# Patient Record
Sex: MALE | Race: WHITE | Employment: OTHER | URBAN - NONMETROPOLITAN AREA
[De-identification: names, ages, dates, MRNs, and addresses within clinical notes are randomized per-mention and may not be internally consistent; named-entity substitution may affect disease eponyms.]

---

## 2019-06-12 ENCOUNTER — HOSPITAL ENCOUNTER (EMERGENCY)
Age: 59
Discharge: HOME | End: 2019-06-12
Payer: MEDICAID

## 2019-06-12 VITALS
OXYGEN SATURATION: 97 % | HEART RATE: 74 BPM | TEMPERATURE: 97.5 F | DIASTOLIC BLOOD PRESSURE: 72 MMHG | SYSTOLIC BLOOD PRESSURE: 141 MMHG | RESPIRATION RATE: 20 BRPM

## 2019-06-12 DIAGNOSIS — J01.90 ACUTE RHINOSINUSITIS: ICD-10-CM

## 2019-06-12 DIAGNOSIS — J20.9 ACUTE BRONCHITIS, UNSPECIFIED ORGANISM: Primary | ICD-10-CM

## 2019-06-12 PROCEDURE — 99212 OFFICE O/P EST SF 10 MIN: CPT

## 2019-06-12 PROCEDURE — 99203 OFFICE O/P NEW LOW 30 MIN: CPT | Performed by: NURSE PRACTITIONER

## 2019-06-12 RX ORDER — FLUTICASONE PROPIONATE 50 MCG
2 SPRAY, SUSPENSION (ML) NASAL DAILY
Qty: 1 BOTTLE | Refills: 0 | Status: SHIPPED | OUTPATIENT
Start: 2019-06-12 | End: 2021-10-25

## 2019-06-12 RX ORDER — DEXTROMETHORPHAN POLISTIREX 30 MG/5ML
60 SUSPENSION ORAL 2 TIMES DAILY PRN
Qty: 148 ML | Refills: 0 | Status: SHIPPED | OUTPATIENT
Start: 2019-06-12 | End: 2021-10-25

## 2019-06-12 RX ORDER — AMOXICILLIN AND CLAVULANATE POTASSIUM 875; 125 MG/1; MG/1
1 TABLET, FILM COATED ORAL 2 TIMES DAILY
Qty: 14 TABLET | Refills: 0 | Status: SHIPPED | OUTPATIENT
Start: 2019-06-12 | End: 2019-06-19

## 2019-06-12 ASSESSMENT — PAIN DESCRIPTION - FREQUENCY: FREQUENCY: CONTINUOUS

## 2019-06-12 ASSESSMENT — PAIN DESCRIPTION - DESCRIPTORS: DESCRIPTORS: PRESSURE;OTHER (COMMENT)

## 2019-06-12 ASSESSMENT — PAIN SCALES - GENERAL: PAINLEVEL_OUTOF10: 4

## 2019-06-12 ASSESSMENT — PAIN DESCRIPTION - LOCATION: LOCATION: HEAD;CHEST

## 2019-06-12 ASSESSMENT — PAIN DESCRIPTION - PAIN TYPE: TYPE: ACUTE PAIN

## 2019-06-12 NOTE — ED TRIAGE NOTES
Patient walked to room 1 for nasal congestion, ear fullness and a dry cough onset a week and a half ago. No other needs.

## 2019-06-13 ASSESSMENT — ENCOUNTER SYMPTOMS
NAUSEA: 0
ABDOMINAL PAIN: 0
VOMITING: 0
SORE THROAT: 0
COUGH: 1
SHORTNESS OF BREATH: 0

## 2019-06-13 NOTE — ED PROVIDER NOTES
[facility]   [facility]      [facility]       Chief Complaint   Patient presents with    Nasal Congestion     onset a week and a half ago    Cough     dry cough        Nurses Notes reviewed and I agree except as noted in the HPI. HISTORY OF PRESENT ILLNESS   Bro Coelho is a 63 y.o. male who presents: The history is provided by the patient. Cough   Cough characteristics:  Non-productive and dry  Severity:  Mild  Onset quality:  Sudden  Duration: 1.5 weeks ago. Timing:  Constant  Progression:  Unchanged  Chronicity:  New  Smoker: no    Relieved by:  None tried  Worsened by:  Nothing  Ineffective treatments:  None tried  Associated symptoms: sinus congestion    Associated symptoms: no chest pain, no chills, no diaphoresis, no ear fullness, no ear pain, no fever, no headaches, no myalgias, no rash, no rhinorrhea, no shortness of breath, no sore throat and no wheezing    Risk factors: no recent infection and no recent travel        REVIEW OF SYSTEMS     Review of Systems   Constitutional: Negative for activity change, appetite change, chills, diaphoresis, fatigue and fever. HENT: Positive for congestion. Negative for ear discharge, ear pain, postnasal drip, rhinorrhea, sinus pressure, sneezing, sore throat, trouble swallowing and voice change. Eyes: Negative for visual disturbance. Respiratory: Positive for cough. Negative for chest tightness, shortness of breath, wheezing and stridor. Cardiovascular: Negative for chest pain. Gastrointestinal: Negative for abdominal pain, nausea and vomiting. Genitourinary: Negative for dysuria. Musculoskeletal: Negative for arthralgias and myalgias. Skin: Negative for rash. Neurological: Negative for dizziness, numbness and headaches. Hematological: Negative for adenopathy. Psychiatric/Behavioral: The patient is not nervous/anxious.         PAST MEDICAL HISTORY         Diagnosis Date    Cellulitis     Depressed had in high school    Diabetes mellitus ([facility] [address]     Hyperlipidemia     Hypertension        SURGICAL HISTORY     Patient  has a past surgical history that includes Foot surgery and Vein Surgery (Left, 2014). CURRENT MEDICATIONS       Discharge Medication List as of 6/12/2019  1:27 PM      CONTINUE these medications which have NOT CHANGED    Details   metFORMIN (GLUCOPHAGE) 500 MG tablet Take 500 mg by mouth 2 times daily (with meals)Historical Med      lisinopril (ZESTRIL) 20 MG tablet Take 20 mg by mouth daily. PARoxetine (PAXIL) 20 MG tablet Take 20 mg by mouth every morning. amLODIPine (NORVASC) 10 MG tablet Take 1 tablet by mouth daily. , Disp-30 tablet, R-3      aspirin 325 MG EC tablet Take 325 mg by mouth daily. Ascorbic Acid (VITAMIN C) 500 MG tablet Take 500 mg by mouth daily. glimepiride (AMARYL) 4 MG tablet Take 4 mg by mouth every morning (before breakfast). ALLERGIES     Patient is has No Known Allergies. FAMILY HISTORY     Patient's family history includes Arthritis in his brother, father, and mother; Cancer in his maternal grandfather and maternal grandmother; Diabetes in his mother; Early Death in his father; Heart Disease in his father; High Blood Pressure in his brother, father, and mother; High Cholesterol in his father; Stroke in his father; Vision Loss in his mother. SOCIAL HISTORY     Patient  reports that he has never smoked. He has never used smokeless tobacco. He reports that he drinks alcohol. He reports that he does not use drugs. PHYSICAL EXAM     ED TRIAGE VITALS  BP: (!) 141/72, Temp: 97.5 °F (36.4 °C), Pulse: 74, Resp: 20, SpO2: 97 %  Physical Exam   Constitutional: He is oriented to person, place, and time. He appears well-developed and well-nourished. Non-toxic appearance. No distress. HENT:   Head: Normocephalic and atraumatic. Head is without right periorbital erythema and without left periorbital erythema.    Right Ear: Hearing, tympanic membrane, external ear and ear canal normal.   Left Ear: Hearing, tympanic membrane, external ear and ear canal normal.   Nose: Mucosal edema (edematous, erythematous bilateral turbinates) present. No rhinorrhea or sinus tenderness. Mouth/Throat: Uvula is midline, oropharynx is clear and moist and mucous membranes are normal. Tonsils are 2+ on the right. Tonsils are 2+ on the left. No tonsillar exudate. Eyes: Right conjunctiva is not injected. Left conjunctiva is not injected. Pupils are equal.   Neck: Normal range of motion. Cardiovascular: Normal rate, regular rhythm and normal heart sounds. No murmur heard. Pulmonary/Chest: Effort normal and breath sounds normal. No respiratory distress. Musculoskeletal:        Right knee: He exhibits normal range of motion. Left knee: He exhibits normal range of motion. Lymphadenopathy:        Head (right side): No submental, no submandibular, no tonsillar, no preauricular and no posterior auricular adenopathy present. Head (left side): No submental, no submandibular, no tonsillar, no preauricular and no posterior auricular adenopathy present. He has no cervical adenopathy. Neurological: He is alert and oriented to person, place, and time. No sensory deficit. Skin: Skin is warm, dry and intact. No rash noted. He is not diaphoretic. No cyanosis. No pallor. No obvious signs of infection or trauma. Psychiatric: He has a normal mood and affect. His behavior is normal.   Nursing note and vitals reviewed. DIAGNOSTIC RESULTS   Labs:No results found for this visit on 06/12/19. IMAGING:    URGENT CARE COURSE:     Vitals:    06/12/19 1258   BP: (!) 141/72   Pulse: 74   Resp: 20   Temp: 97.5 °F (36.4 °C)   TempSrc: Temporal   SpO2: 97%       Medications - No data to display  PROCEDURES:  None  FINAL IMPRESSION       1. Acute bronchitis, unspecified organism    2.  Acute rhinosinusitis        DISPOSITION/PLAN   DISPOSITION Decision To Discharge 2019 01:25:28 PM  Afebrile  No acute distress  Start on   Orders Placed This Encounter   Medications    fluticasone (FLONASE) 50 MCG/ACT nasal spray     Si sprays by Nasal route daily Apply daily to each nare     Dispense:  1 Bottle     Refill:  0    amoxicillin-clavulanate (AUGMENTIN) 875-125 MG per tablet     Sig: Take 1 tablet by mouth 2 times daily for 7 days     Dispense:  14 tablet     Refill:  0    dextromethorphan (DELSYM) 30 MG/5ML extended release liquid     Sig: Take 10 mLs by mouth 2 times daily as needed for Cough     Dispense:  148 mL     Refill:  0   Plenty of fluids orally. Salt water gargles as needed for sore throat. Cool mist humidifier at bedside for congestion. Saline rinses as needed for nasal congestion. May take Tylenol  And or Ibuprofen for fever or body aches as directed. May take OTC antihistamines/ decongestant as needed. Follow up with family doctor. Pt to go to ER if symptoms worsen, new symptoms develop, high fever >102, vomiting, breathing difficulty, lethargy. PATIENT REFERRED TO:  MAGGIE Ulloa - CNP  [address] [place] [address] [address]  [phone]    On 2019  If symptoms worsen    Patient instructed to follow up with PCP. If symptoms worsen, become severe or new symptoms develop patient instructedto go to the emergency room immediately.     DISCHARGE MEDICATIONS:  Discharge Medication List as of 2019  1:27 PM      START taking these medications    Details   fluticasone (FLONASE) 50 MCG/ACT nasal spray 2 sprays by Nasal route daily Apply daily to each nare, Disp-1 Bottle, R-0Normal      amoxicillin-clavulanate (AUGMENTIN) 875-125 MG per tablet Take 1 tablet by mouth 2 times daily for 7 days, Disp-14 tablet, R-0Normal      dextromethorphan (DELSYM) 30 MG/5ML extended release liquid Take 10 mLs by mouth 2 times daily as needed for Cough, Disp-148 mL, R-0Normal           Discharge Medication List as of 2019  1:27 PM Patient giveneducational materials - see patient instructions. Discussed use, benefit, and side effects of prescribed medications. All patient questions answered. Pt voiced understanding. Reviewed health maintenance. Patient agreedwith treatment plan. Follow up as directed.      MAGGIE Chambers CNP, APRN - CNP  06/17/19 0334 neuropathic pain at bilateral lower extremities to thighs

## 2019-06-17 ASSESSMENT — ENCOUNTER SYMPTOMS
RHINORRHEA: 0
WHEEZING: 0
SINUS PRESSURE: 0
CHEST TIGHTNESS: 0
TROUBLE SWALLOWING: 0
VOICE CHANGE: 0
STRIDOR: 0
SINUS CONGESTION: 1

## 2020-02-12 ENCOUNTER — APPOINTMENT (OUTPATIENT)
Dept: GENERAL RADIOLOGY | Age: 60
End: 2020-02-12
Payer: MEDICAID

## 2020-02-12 ENCOUNTER — APPOINTMENT (OUTPATIENT)
Dept: CT IMAGING | Age: 60
End: 2020-02-12
Payer: MEDICAID

## 2020-02-12 ENCOUNTER — HOSPITAL ENCOUNTER (EMERGENCY)
Age: 60
Discharge: HOME | End: 2020-02-12
Payer: MEDICAID

## 2020-02-12 VITALS
BODY MASS INDEX: 40.74 KG/M2 | HEIGHT: 71 IN | RESPIRATION RATE: 20 BRPM | DIASTOLIC BLOOD PRESSURE: 78 MMHG | WEIGHT: 291 LBS | TEMPERATURE: 98 F | SYSTOLIC BLOOD PRESSURE: 128 MMHG | HEART RATE: 99 BPM | OXYGEN SATURATION: 96 %

## 2020-02-12 PROCEDURE — 99284 EMERGENCY DEPT VISIT MOD MDM: CPT

## 2020-02-12 PROCEDURE — 73700 CT LOWER EXTREMITY W/O DYE: CPT

## 2020-02-12 PROCEDURE — 73564 X-RAY EXAM KNEE 4 OR MORE: CPT

## 2020-02-12 PROCEDURE — 6370000000 HC RX 637 (ALT 250 FOR IP): Performed by: PHYSICIAN ASSISTANT

## 2020-02-12 PROCEDURE — 2709999900 HC NON-CHARGEABLE SUPPLY

## 2020-02-12 RX ORDER — HYDROCODONE BITARTRATE AND ACETAMINOPHEN 5; 325 MG/1; MG/1
1 TABLET ORAL ONCE
Status: COMPLETED | OUTPATIENT
Start: 2020-02-12 | End: 2020-02-12

## 2020-02-12 RX ORDER — HYDROCODONE BITARTRATE AND ACETAMINOPHEN 5; 325 MG/1; MG/1
1 TABLET ORAL EVERY 6 HOURS PRN
Qty: 10 TABLET | Refills: 0 | Status: SHIPPED | OUTPATIENT
Start: 2020-02-12 | End: 2020-02-15

## 2020-02-12 RX ADMIN — HYDROCODONE BITARTRATE AND ACETAMINOPHEN 1 TABLET: 5; 325 TABLET ORAL at 19:31

## 2020-02-12 ASSESSMENT — ENCOUNTER SYMPTOMS
SHORTNESS OF BREATH: 0
COUGH: 0
DIARRHEA: 0
ABDOMINAL PAIN: 0
VOMITING: 0
BACK PAIN: 0

## 2020-02-12 ASSESSMENT — PAIN SCALES - GENERAL
PAINLEVEL_OUTOF10: 6

## 2020-02-12 ASSESSMENT — PAIN DESCRIPTION - ORIENTATION: ORIENTATION: LEFT

## 2020-02-12 ASSESSMENT — PAIN DESCRIPTION - PAIN TYPE: TYPE: ACUTE PAIN

## 2020-02-12 ASSESSMENT — PAIN DESCRIPTION - LOCATION: LOCATION: KNEE

## 2020-02-12 NOTE — LETTER
[facility] Emergency Department   [facility], [address]          PROOF OF PRESENCE      To Whom It May Concern:    Yaa Poe was present in the Emergency Department at [facility] Emergency Department on 2/12/20. He may return to work on 2/14/20 if cleared by orthopedics. He is not to put weight on the left leg until cleared by orthopedic provider.                                  Sincerely,        Haider Duff PA-C

## 2020-02-13 NOTE — ED NOTES
Pt resting in bed with eyes closed and respirations regular. Pt VS reassessed. Pt states pain is still the same at a 6 out of 10. Updated pt on POC. Pt verbalized understanding.  Will continue to monitor      Gloria Pardo RN  02/12/20 2028

## 2020-02-13 NOTE — ED NOTES
Assumed care at this time. Bedside report received from Jm [place]. Pt alert and c/o left knee and leg pain only. Pt respirations regular.  Will continue to monitor      Holley Delgadillo RN  02/12/20 191
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
## 2020-04-17 ENCOUNTER — HOSPITAL ENCOUNTER (EMERGENCY)
Age: 60
Discharge: HOME | End: 2020-04-17
Payer: MEDICAID

## 2020-04-17 VITALS
HEIGHT: 70 IN | RESPIRATION RATE: 17 BRPM | OXYGEN SATURATION: 95 % | WEIGHT: 291 LBS | HEART RATE: 102 BPM | BODY MASS INDEX: 41.66 KG/M2 | TEMPERATURE: 98.1 F | DIASTOLIC BLOOD PRESSURE: 88 MMHG | SYSTOLIC BLOOD PRESSURE: 142 MMHG

## 2020-04-17 PROCEDURE — 99282 EMERGENCY DEPT VISIT SF MDM: CPT

## 2020-04-17 ASSESSMENT — ENCOUNTER SYMPTOMS
ABDOMINAL PAIN: 0
SHORTNESS OF BREATH: 0
RHINORRHEA: 0
DIARRHEA: 0
SORE THROAT: 0
COUGH: 0
BACK PAIN: 0
EYE REDNESS: 0
ABDOMINAL DISTENTION: 0
EYE DISCHARGE: 0
VOMITING: 0
NAUSEA: 0
CHEST TIGHTNESS: 0

## 2020-04-17 NOTE — ED PROVIDER NOTES
Pressure in his brother, father, and mother; High Cholesterol in his father; Stroke in his father; Vision Loss in his mother. SOCIAL HISTORY       Social History     Socioeconomic History    Marital status: Single     Spouse name: Not on file    Number of children: Not on file    Years of education: Not on file    Highest education level: Not on file   Occupational History    Not on file   Social Needs    Financial resource strain: Not on file    Food insecurity     Worry: Not on file     Inability: Not on file    Transportation needs     Medical: Not on file     Non-medical: Not on file   Tobacco Use    Smoking status: Never Smoker    Smokeless tobacco: Never Used   Substance and Sexual Activity    Alcohol use: Yes     Comment: social     Drug use: No    Sexual activity: Not on file   Lifestyle    Physical activity     Days per week: Not on file     Minutes per session: Not on file    Stress: Not on file   Relationships    Social connections     Talks on phone: Not on file     Gets together: Not on file     Attends Latter day service: Not on file     Active member of club or organization: Not on file     Attends meetings of clubs or organizations: Not on file     Relationship status: Not on file    Intimate partner violence     Fear of current or ex partner: Not on file     Emotionally abused: Not on file     Physically abused: Not on file     Forced sexual activity: Not on file   Other Topics Concern    Not on file   Social History Narrative    Not on file       PHYSICAL EXAM     INITIAL VITALS:  height is 5' 10\" (1.778 m) and weight is 291 lb (132 kg). His oral temperature is 98.1 °F (36.7 °C). His blood pressure is 142/88 (abnormal) and his pulse is 102. His respiration is 17 and oxygen saturation is 95%. Physical Exam  Vitals signs and nursing note reviewed. Constitutional:       Appearance: Normal appearance. He is well-developed. HENT:      Head: Normocephalic.       Right Ear: External ear normal.      Left Ear: External ear normal.      Nose: Nose normal.      Mouth/Throat:      Pharynx: Uvula midline. Eyes:      Conjunctiva/sclera: Conjunctivae normal.   Neck:      Musculoskeletal: Normal range of motion and neck supple. Cardiovascular:      Rate and Rhythm: Normal rate and regular rhythm. Heart sounds: Normal heart sounds, S1 normal and S2 normal.   Pulmonary:      Effort: Pulmonary effort is normal. No respiratory distress. Breath sounds: Normal breath sounds. Comments: Normal lung sounds  Chest:      Chest wall: No tenderness. Abdominal:      General: Bowel sounds are normal. There is no distension. Palpations: Abdomen is soft. Tenderness: There is no abdominal tenderness. Comments: No abdominal tenderness   Musculoskeletal: Normal range of motion. Comments: No tenderness to the lumbar region. No signs of leg swelling. Warm feet. Good pulses. Skin:     General: Skin is warm and dry. Coloration: Skin is not pale. Findings: No erythema or rash. Neurological:      Mental Status: He is alert and oriented to person, place, and time. Psychiatric:         Behavior: Behavior normal.         Thought Content: Thought content normal.         Judgment: Judgment normal.         DIFFERENTIAL DIAGNOSIS:   Including but not limited to: diabetic neuropathy, electrolyte imbalance    DIAGNOSTIC RESULTS     EKG: All EKG's are interpreted by the Emergency Department Physician who eithersigns or Co-signs this chart in the absence of a cardiologist.    None    RADIOLOGY: non-plainfilm images(s) such as CT, Ultrasound and MRI are read by the radiologist.  Plain radiographic images are visualized and preliminarily interpreted by the emergency physician unless otherwise stated below.   No orders to display         LABS:   Labs Reviewed - No data to display    EMERGENCY DEPARTMENT COURSE:   Vitals:    Vitals:    04/17/20 1451   BP: (!) 142/88   Pulse: 102 Resp: 17   Temp: 98.1 °F (36.7 °C)   TempSrc: Oral   SpO2: 95%   Weight: 291 lb (132 kg)   Height: 5' 10\" (1.778 m)     MDM    Patient seen and evaluated in the emergency department, patient appeared to be no acute distress, vital signs were reviewed, slight tachycardia noted although not noted on exam.  Physical exam was completed, he had some slight lower extremity paresthesias, appeared to be consistent with sciatic neuropathy, he has microangiographic changes of the feet but he has good pulses, he has no wounds or signs of erythema or cellulitis to the lower extremities, there is no edema to the lower extremities. He was able to ambulate with little difficulty, he is able to stand on his toes with little difficulty. Symptoms do not appear to be consistent with a spinal cord injury, he has had no recent acute trauma. I discussed my findings my plan of care with the patient he needs to follow-up with his primary care provider, possibly have some vitamin and mineral levels drawn, but at this time not required in the emergency department. He verbalized understanding of plan of care. While here in the emergency department maintained stable course appropriate for discharge. Medications - No data to display      Patient was seenindependently by myself. The patient's final impression and disposition and plan was determined by myself. CRITICAL CARE:   None    CONSULTS:  None    PROCEDURES:  None    FINAL IMPRESSION     1. Peripheral polyneuropathy    2.  Paresthesias          DISPOSITION/PLAN   Patient was discharged in stable condition  PATIENT REFERREDTO:  MAGGIE [place] - LYN  [facility]  [phone]    Call   For follow up and evaluation      DISCHARGE MEDICATIONS:  Discharge Medication List as of 4/17/2020  3:07 PM          (Please note that portions of this note were completed with a voice recognition program.  Efforts were made to edit the dictations but occasionally

## 2020-04-17 NOTE — ED TRIAGE NOTES
Presents to ER for complaints of bilateral lower leg numbness that he noticed this morning. Pt states he has a history of diabetes and occasionally his feet turn numb, but he has not had anything like this before. Pt denies any pain at this time. Respirations unlabored.

## 2021-10-25 ENCOUNTER — HOSPITAL ENCOUNTER (EMERGENCY)
Age: 61
Discharge: HOME | End: 2021-10-25
Payer: MEDICAID

## 2021-10-25 VITALS
DIASTOLIC BLOOD PRESSURE: 87 MMHG | HEART RATE: 78 BPM | OXYGEN SATURATION: 98 % | RESPIRATION RATE: 16 BRPM | SYSTOLIC BLOOD PRESSURE: 133 MMHG | TEMPERATURE: 98.3 F

## 2021-10-25 DIAGNOSIS — J06.9 VIRAL URI WITH COUGH: Primary | ICD-10-CM

## 2021-10-25 DIAGNOSIS — Z20.822 COVID-19 RULED OUT BY LABORATORY TESTING: ICD-10-CM

## 2021-10-25 LAB — SARS-COV-2, NAA: NOT  DETECTED

## 2021-10-25 PROCEDURE — 99213 OFFICE O/P EST LOW 20 MIN: CPT | Performed by: NURSE PRACTITIONER

## 2021-10-25 PROCEDURE — 99213 OFFICE O/P EST LOW 20 MIN: CPT

## 2021-10-25 PROCEDURE — 87635 SARS-COV-2 COVID-19 AMP PRB: CPT

## 2021-10-25 RX ORDER — PRASUGREL 10 MG/1
TABLET, FILM COATED ORAL
COMMUNITY
Start: 2021-04-15

## 2021-10-25 RX ORDER — ASPIRIN 81 MG/1
TABLET, DELAYED RELEASE ORAL
COMMUNITY
Start: 2021-10-12

## 2021-10-25 RX ORDER — ATORVASTATIN CALCIUM 10 MG/1
TABLET, FILM COATED ORAL
COMMUNITY
Start: 2021-04-14

## 2021-10-25 RX ORDER — BENZONATATE 200 MG/1
200 CAPSULE ORAL 3 TIMES DAILY PRN
Qty: 21 CAPSULE | Refills: 0 | Status: SHIPPED | OUTPATIENT
Start: 2021-10-25 | End: 2021-11-01

## 2021-10-25 RX ORDER — METOPROLOL SUCCINATE 25 MG/1
TABLET, EXTENDED RELEASE ORAL
COMMUNITY
Start: 2021-08-23

## 2021-10-25 ASSESSMENT — ENCOUNTER SYMPTOMS
COUGH: 1
SINUS PAIN: 0
SHORTNESS OF BREATH: 0
RHINORRHEA: 1
WHEEZING: 0
CHEST TIGHTNESS: 0
NAUSEA: 0
EYE REDNESS: 0
SINUS PRESSURE: 0
TROUBLE SWALLOWING: 0
DIARRHEA: 0
SORE THROAT: 0
VOMITING: 0
EYE DISCHARGE: 0

## 2021-10-25 NOTE — ED PROVIDER NOTES
prasugrel (EFFIENT) 10 MG TABS Prasugrel (Effient) 10 mg Tablet Active 10 MG PO Daily 30 April 15th, 2021 3:38pmHistorical Med      atorvastatin (LIPITOR) 10 MG tablet Atorvastatin (Lipitor) 10 mg Tablet Active 10 MG PO Daily in the evening April 14th, 2021 9:52amHistorical Med      SM ASPIRIN ADULT LOW STRENGTH 81 MG EC tablet take 1 tablet by mouth once daily, DAWHistorical Med      metoprolol succinate (TOPROL XL) 25 MG extended release tablet take 1 tablet by mouth once dailyHistorical Med      metFORMIN (GLUCOPHAGE) 500 MG tablet Take 500 mg by mouth 2 times daily (with meals)Historical Med      lisinopril (ZESTRIL) 20 MG tablet Take 20 mg by mouth daily. amLODIPine (NORVASC) 10 MG tablet Take 1 tablet by mouth daily. , Disp-30 tablet, R-3      glimepiride (AMARYL) 4 MG tablet Take 4 mg by mouth every morning (before breakfast). Ascorbic Acid (VITAMIN C) 500 MG tablet Take 500 mg by mouth daily. ALLERGIES     Patient is has No Known Allergies. FAMILY HISTORY     Patient'sfamily history includes Arthritis in his brother, father, and mother; Cancer in his maternal grandfather and maternal grandmother; Diabetes in his mother; Early Death in his father; Heart Disease in his father; High Blood Pressure in his brother, father, and mother; High Cholesterol in his father; Stroke in his father; Vision Loss in his mother. SOCIAL HISTORY     Patient  reports that he has never smoked. He has never used smokeless tobacco. He reports current alcohol use. He reports that he does not use drugs. PHYSICAL EXAM     ED TRIAGE VITALS  BP: 133/87, Temp: 98.3 °F (36.8 °C), Pulse: 78, Resp: 16, SpO2: 98 %  Physical Exam  Vitals and nursing note reviewed. Constitutional:       General: He is not in acute distress. Appearance: Normal appearance. He is well-developed. He is not ill-appearing, toxic-appearing or diaphoretic. HENT:      Head: Normocephalic and atraumatic. Jaw: No trismus. Right Ear: Hearing, tympanic membrane, ear canal and external ear normal. No mastoid tenderness. No hemotympanum. Tympanic membrane is not perforated, erythematous or bulging. Left Ear: Hearing, tympanic membrane, ear canal and external ear normal. No mastoid tenderness. No hemotympanum. Tympanic membrane is not perforated, erythematous or bulging. Nose: Congestion present. No rhinorrhea. Mouth/Throat:      Mouth: Mucous membranes are moist.      Pharynx: Oropharynx is clear. Uvula midline. Tonsils: No tonsillar abscesses. Eyes:      General: No scleral icterus. Conjunctiva/sclera: Conjunctivae normal.   Neck:      Thyroid: No thyromegaly. Trachea: Trachea normal.   Cardiovascular:      Rate and Rhythm: Normal rate and regular rhythm. No extrasystoles are present. Chest Wall: PMI is not displaced. Heart sounds: Normal heart sounds. No murmur heard. No friction rub. No gallop. Pulmonary:      Effort: Pulmonary effort is normal. No accessory muscle usage or respiratory distress. Breath sounds: Normal breath sounds. Musculoskeletal:      Cervical back: Normal range of motion and neck supple. Lymphadenopathy:      Head:      Right side of head: No submental, submandibular, tonsillar, preauricular, posterior auricular or occipital adenopathy. Left side of head: No submental, submandibular, tonsillar, preauricular, posterior auricular or occipital adenopathy. Cervical: No cervical adenopathy. Upper Body:      Right upper body: No supraclavicular adenopathy. Left upper body: No supraclavicular adenopathy. Skin:     General: Skin is warm and dry. Coloration: Skin is not pale. Findings: No rash. Comments: Skin intact, warm and dry to touch, no rashes noted on exposed surfaces. Neurological:      Mental Status: He is alert and oriented to person, place, and time. He is not disoriented.    Psychiatric:         Mood and Affect: Mood normal.         Behavior: Behavior is cooperative. DIAGNOSTIC RESULTS   Labs:   Results for orders placed or performed during the hospital encounter of 10/25/21   COVID-19, Rapid   Result Value Ref Range    SARS-CoV-2, [place] NOT  DETECTED NOT DETECTED       IMAGING:  No orders to display     URGENT CARE COURSE:     Vitals:    10/25/21 1812   BP: 133/87   Pulse: 78   Resp: 16   Temp: 98.3 °F (36.8 °C)   TempSrc: Temporal   SpO2: 98%       Medications - No data to display  PROCEDURES:  None  FINALIMPRESSION      1. Viral URI with cough    2. COVID-19 ruled out by laboratory testing        DISPOSITION/PLAN   DISPOSITION Decision To Discharge 10/25/2021 06:47:54 PM  Nontoxic, no distress. COVID-19 negative. Exam consistent with a viral URI with cough. Medication as prescribed. Mucinex over-the-counter. Increase fluids. If symptoms worsen go to ER. PATIENT REFERRED TO:  Bennie Davis MD  [address]  [phone]      Follow-up with PCP as needed. Medication as prescribed. Mucinex over-the-counter. If symptoms worsen return or go to ER.     DISCHARGE MEDICATIONS:  Discharge Medication List as of 10/25/2021  6:48 PM      START taking these medications    Details   benzonatate (TESSALON) 200 MG capsule Take 1 capsule by mouth 3 times daily as needed for Cough, Disp-21 capsule, R-0Normal           Discharge Medication List as of 10/25/2021  6:48 PM          MAGGIE Morales CNP, APRN - CNP  10/25/21 7575

## 2022-04-16 ENCOUNTER — HOSPITAL ENCOUNTER (EMERGENCY)
Age: 62
Discharge: HOME | End: 2022-04-16
Payer: MEDICAID

## 2022-04-16 VITALS
BODY MASS INDEX: 43.05 KG/M2 | HEART RATE: 88 BPM | SYSTOLIC BLOOD PRESSURE: 145 MMHG | WEIGHT: 300 LBS | RESPIRATION RATE: 16 BRPM | TEMPERATURE: 96.8 F | DIASTOLIC BLOOD PRESSURE: 69 MMHG | OXYGEN SATURATION: 96 %

## 2022-04-16 DIAGNOSIS — L03.116 CELLULITIS OF LEFT LOWER LEG: Primary | ICD-10-CM

## 2022-04-16 PROCEDURE — 99213 OFFICE O/P EST LOW 20 MIN: CPT

## 2022-04-16 PROCEDURE — 99214 OFFICE O/P EST MOD 30 MIN: CPT | Performed by: NURSE PRACTITIONER

## 2022-04-16 RX ORDER — SULFAMETHOXAZOLE AND TRIMETHOPRIM 800; 160 MG/1; MG/1
1 TABLET ORAL 2 TIMES DAILY
Qty: 20 TABLET | Refills: 0 | Status: SHIPPED | OUTPATIENT
Start: 2022-04-16 | End: 2022-04-26 | Stop reason: ALTCHOICE

## 2022-04-16 RX ORDER — CHLORHEXIDINE GLUCONATE 213 G/1000ML
SOLUTION TOPICAL
Qty: 236 ML | Refills: 0 | Status: SHIPPED | OUTPATIENT
Start: 2022-04-16 | End: 2022-04-30

## 2022-04-16 ASSESSMENT — PAIN DESCRIPTION - DESCRIPTORS: DESCRIPTORS: ACHING

## 2022-04-16 ASSESSMENT — PAIN DESCRIPTION - ONSET: ONSET: ON-GOING

## 2022-04-16 ASSESSMENT — ENCOUNTER SYMPTOMS
COUGH: 0
EYES NEGATIVE: 1
CHEST TIGHTNESS: 0
SHORTNESS OF BREATH: 0
ALLERGIC/IMMUNOLOGIC NEGATIVE: 1
GASTROINTESTINAL NEGATIVE: 1

## 2022-04-16 ASSESSMENT — PAIN SCALES - GENERAL: PAINLEVEL_OUTOF10: 7

## 2022-04-16 ASSESSMENT — PAIN - FUNCTIONAL ASSESSMENT
PAIN_FUNCTIONAL_ASSESSMENT: 0-10
PAIN_FUNCTIONAL_ASSESSMENT: PREVENTS OR INTERFERES SOME ACTIVE ACTIVITIES AND ADLS

## 2022-04-16 ASSESSMENT — PAIN DESCRIPTION - FREQUENCY: FREQUENCY: CONTINUOUS

## 2022-04-16 ASSESSMENT — PAIN DESCRIPTION - LOCATION: LOCATION: FOOT

## 2022-04-16 ASSESSMENT — PAIN DESCRIPTION - PAIN TYPE: TYPE: ACUTE PAIN

## 2022-04-16 ASSESSMENT — PAIN DESCRIPTION - ORIENTATION: ORIENTATION: RIGHT

## 2022-04-16 ASSESSMENT — PAIN DESCRIPTION - PROGRESSION: CLINICAL_PROGRESSION: GRADUALLY WORSENING

## 2022-04-16 NOTE — ED PROVIDER NOTES
[address]       Chief Complaint   Patient presents with    Wound Check     right foot , inside of ankle       Nurses Notes reviewed and I agree except as noted in the HPI. HISTORY OF PRESENT ILLNESS   Corey Bella is a 65 y.o. male who presents with left foot wound and cellulitis of the left lower leg. Has chronic venous disease and ulcerations. Sees lymphedema/wound clinic of [facility] in the past. This area has been open x 1 week. He has been keeping clean as possible. Blood sugars have been in the lower 100s per pt. REVIEW OF SYSTEMS     Review of Systems   Constitutional: Positive for activity change. HENT: Negative. Eyes: Negative. Respiratory: Negative for cough, chest tightness and shortness of breath. Cardiovascular: Positive for leg swelling. Negative for palpitations. Gastrointestinal: Negative. Endocrine: Negative. Genitourinary: Negative. Musculoskeletal: Positive for joint swelling and myalgias. Skin: Positive for rash and wound. Allergic/Immunologic: Negative. Neurological: Negative for weakness, light-headedness and numbness. Hematological: Negative for adenopathy. PAST MEDICAL HISTORY         Diagnosis Date    Cellulitis     Depressed     had in high school    Diabetes mellitus ([facility] [address]     Hyperlipidemia     Hypertension        SURGICAL HISTORY     Patient  has a past surgical history that includes Foot surgery; Vein Surgery (Left, 2014); and Eye surgery. CURRENT MEDICATIONS       Previous Medications    AMLODIPINE (NORVASC) 10 MG TABLET    Take 1 tablet by mouth daily. ASCORBIC ACID (VITAMIN C) 500 MG TABLET    Take 500 mg by mouth daily. ATORVASTATIN (LIPITOR) 10 MG TABLET    Atorvastatin (Lipitor) 10 mg Tablet Active 10 MG PO Daily in the evening April 14th, 2021 9:52am    GLIMEPIRIDE (AMARYL) 4 MG TABLET    Take 4 mg by mouth every morning (before breakfast).     LISINOPRIL (ZESTRIL) 20 MG TABLET    Take 20 mg by mouth daily. METFORMIN (GLUCOPHAGE) 500 MG TABLET    Take 500 mg by mouth 2 times daily (with meals)    METOPROLOL SUCCINATE (TOPROL XL) 25 MG EXTENDED RELEASE TABLET    take 1 tablet by mouth once daily    PRASUGREL (EFFIENT) 10 MG TABS    Prasugrel (Effient) 10 mg Tablet Active 10 MG PO Daily 30 April 15th, 2021 3:38pm    SM ASPIRIN ADULT LOW STRENGTH 81 MG EC TABLET    take 1 tablet by mouth once daily       ALLERGIES     Patient is has No Known Allergies. FAMILY HISTORY     Patient'sfamily history includes Arthritis in his brother, father, and mother; Cancer in his maternal grandfather and maternal grandmother; Diabetes in his mother; Early Death in his father; Heart Disease in his father; High Blood Pressure in his brother, father, and mother; High Cholesterol in his father; Stroke in his father; Vision Loss in his mother. SOCIAL HISTORY     Patient  reports that he has never smoked. He has never used smokeless tobacco. He reports current alcohol use. He reports that he does not use drugs. PHYSICAL EXAM     ED TRIAGE VITALS  BP: (!) 145/69, Temp: 96.8 °F (36 °C), Pulse: 88, Resp: 16, SpO2: 96 %  Physical Exam  Vitals and nursing note reviewed. Constitutional:       Appearance: He is obese. He is ill-appearing. HENT:      Head: Normocephalic. Right Ear: Ear canal and external ear normal.      Left Ear: Ear canal and external ear normal.      Nose: Nose normal.      Mouth/Throat:      Mouth: Mucous membranes are moist.   Eyes:      General: No scleral icterus. Conjunctiva/sclera: Conjunctivae normal.   Cardiovascular:      Rate and Rhythm: Normal rate and regular rhythm. Pulses: Normal pulses. Heart sounds: Normal heart sounds. Pulmonary:      Effort: Pulmonary effort is normal.   Abdominal:      General: Abdomen is flat. Musculoskeletal:         General: Swelling and tenderness ( left lower extremity) present.       Cervical back: Normal range of motion and neck supple. No tenderness. Lymphadenopathy:      Cervical: No cervical adenopathy. Skin:     General: Skin is warm and dry. Capillary Refill: Capillary refill takes 2 to 3 seconds. Coloration: Skin is not pale. Findings: Erythema (left lower leg. see photo), lesion and rash present. Neurological:      Mental Status: He is alert. DIAGNOSTIC RESULTS   Labs: No results found for this visit on 04/16/22. IMAGING:  No orders to display     URGENT CARE COURSE:     Vitals:    04/16/22 1744   BP: (!) 145/69   Pulse: 88   Resp: 16   Temp: 96.8 °F (36 °C)   TempSrc: Temporal   SpO2: 96%   Weight: 300 lb (136.1 kg)       Medications - No data to display  PROCEDURES:  None  FINALIMPRESSION    I have reviewed the patient's medical history in detail and updated the computerized patient record. HPI/ROS per the patient and caregiver. Overall non toxic in appearance. Answers questions appropriately. Conditions discussed and addressed this visit include:     Pt with chronic venous ulcerations of the left lower leg. Managed per lymphedema clinic in the past. Will start with antibiotic tx, dry dressing, twice daily cleansing, and pt to follow up with pcp this week for new referral to lymphedema clinic. 1. Cellulitis of left lower leg        DISPOSITION/PLAN   DISPOSITION      PATIENT REFERRED TO:  Isrrael Corley MD  [address] [facility]  [phone]    In 3 days  For wound re-check    DISCHARGE MEDICATIONS:  New Prescriptions    CHLORHEXIDINE (HIBICLENS) 4 % EXTERNAL LIQUID    Apply topically daily as needed.     SULFAMETHOXAZOLE-TRIMETHOPRIM (BACTRIM DS) 800-160 MG PER TABLET    Take 1 tablet by mouth 2 times daily for 10 days     Current Discharge Medication List          MAGGIE Maldonado - MAGGIE James - LYN  04/16/22 7885

## 2022-04-16 NOTE — ED TRIAGE NOTES
Wound cleansed with wound cleanser and dried thoroughly. Gauze pad placed over the wound and secured with pt's sock. Pt tolerated well.

## 2022-04-16 NOTE — ED TRIAGE NOTES
Pt to room 3 with concerns regarding a sore that has been on his right medial ankle for \"quite some time\" but has worsened in the last few days.

## 2022-04-20 ENCOUNTER — TELEPHONE (OUTPATIENT)
Dept: WOUND CARE | Age: 62
End: 2022-04-20

## 2022-04-20 NOTE — TELEPHONE ENCOUNTER
Returned call to patient regarding referral to wound clinic to get patient scheduled. No answer. Left VM.

## 2022-04-20 NOTE — TELEPHONE ENCOUNTER
----- Message from Doron Potter sent at 4/20/2022 10:13 AM EDT -----   [phone] JENNIFER RETURNED MISSED CALL TO GET HIM AN APPT. HE STATES THAT HE NEEDS TO BE SEEN ASAP. HIS LEGS ARE BAD AND GETTING WORSE.

## 2022-04-20 NOTE — TELEPHONE ENCOUNTER
----- Message from Kenna Haq sent at 4/20/2022 10:13 AM EDT -----   [phone] JENNIFER RETURNED MISSED CALL TO GET HIM AN APPT. HE STATES THAT HE NEEDS TO BE SEEN ASAP. HIS LEGS ARE BAD AND GETTING WORSE.

## 2022-04-26 ENCOUNTER — HOSPITAL ENCOUNTER (OUTPATIENT)
Dept: WOUND CARE | Age: 62
Discharge: HOME | End: 2022-04-26
Payer: MEDICAID

## 2022-04-26 VITALS — TEMPERATURE: 97.2 F

## 2022-04-26 DIAGNOSIS — L98.499 TYPE 2 DIABETES MELLITUS WITH OTHER SKIN ULCER, UNSPECIFIED WHETHER LONG TERM INSULIN USE (HCC): ICD-10-CM

## 2022-04-26 DIAGNOSIS — L97.302: ICD-10-CM

## 2022-04-26 DIAGNOSIS — L84 CALLUS OF FOOT: ICD-10-CM

## 2022-04-26 DIAGNOSIS — I87.319 CHRONIC VENOUS HYPERTENSION W ULCERATION (HCC): ICD-10-CM

## 2022-04-26 DIAGNOSIS — E11.43 TYPE 2 DIABETES MELLITUS WITH DIABETIC AUTONOMIC NEUROPATHY, WITH LONG-TERM CURRENT USE OF INSULIN (HCC): ICD-10-CM

## 2022-04-26 DIAGNOSIS — E11.622 TYPE 2 DIABETES MELLITUS WITH OTHER SKIN ULCER, UNSPECIFIED WHETHER LONG TERM INSULIN USE (HCC): ICD-10-CM

## 2022-04-26 DIAGNOSIS — L60.2 ONYCHOGRYPHOSIS: ICD-10-CM

## 2022-04-26 DIAGNOSIS — L97.301: Primary | ICD-10-CM

## 2022-04-26 DIAGNOSIS — S90.229A CONTUSION OF NAILBED OF TOE: ICD-10-CM

## 2022-04-26 DIAGNOSIS — Z79.4 TYPE 2 DIABETES MELLITUS WITH DIABETIC AUTONOMIC NEUROPATHY, WITH LONG-TERM CURRENT USE OF INSULIN (HCC): ICD-10-CM

## 2022-04-26 DIAGNOSIS — L97.909 CHRONIC VENOUS HYPERTENSION W ULCERATION (HCC): ICD-10-CM

## 2022-04-26 DIAGNOSIS — L97.322 SKIN ULCER OF LEFT ANKLE WITH FAT LAYER EXPOSED (HCC): ICD-10-CM

## 2022-04-26 PROCEDURE — 97597 DBRDMT OPN WND 1ST 20 CM/<: CPT

## 2022-04-26 PROCEDURE — 11721 DEBRIDE NAIL 6 OR MORE: CPT

## 2022-04-26 PROCEDURE — 11055 PARING/CUTG B9 HYPRKER LES 1: CPT

## 2022-04-26 PROCEDURE — 99213 OFFICE O/P EST LOW 20 MIN: CPT

## 2022-04-26 PROCEDURE — 29580 STRAPPING UNNA BOOT: CPT

## 2022-04-26 RX ORDER — LIDOCAINE HYDROCHLORIDE 20 MG/ML
JELLY TOPICAL ONCE
Status: CANCELLED | OUTPATIENT
Start: 2022-04-26 | End: 2022-04-26

## 2022-04-26 RX ORDER — BETAMETHASONE DIPROPIONATE 0.05 %
OINTMENT (GRAM) TOPICAL ONCE
Status: CANCELLED | OUTPATIENT
Start: 2022-04-26 | End: 2022-04-26

## 2022-04-26 RX ORDER — LIDOCAINE 40 MG/G
CREAM TOPICAL ONCE
Status: CANCELLED | OUTPATIENT
Start: 2022-04-26 | End: 2022-04-26

## 2022-04-26 RX ORDER — BACITRACIN ZINC AND POLYMYXIN B SULFATE 500; 1000 [USP'U]/G; [USP'U]/G
OINTMENT TOPICAL ONCE
Status: CANCELLED | OUTPATIENT
Start: 2022-04-26 | End: 2022-04-26

## 2022-04-26 RX ORDER — CLOBETASOL PROPIONATE 0.5 MG/G
OINTMENT TOPICAL ONCE
Status: CANCELLED | OUTPATIENT
Start: 2022-04-26 | End: 2022-04-26

## 2022-04-26 RX ORDER — BACITRACIN, NEOMYCIN, POLYMYXIN B 400; 3.5; 5 [USP'U]/G; MG/G; [USP'U]/G
OINTMENT TOPICAL ONCE
Status: CANCELLED | OUTPATIENT
Start: 2022-04-26 | End: 2022-04-26

## 2022-04-26 RX ORDER — LIDOCAINE HYDROCHLORIDE 40 MG/ML
SOLUTION TOPICAL ONCE
Status: CANCELLED | OUTPATIENT
Start: 2022-04-26 | End: 2022-04-26

## 2022-04-26 RX ORDER — GINSENG 100 MG
CAPSULE ORAL ONCE
Status: CANCELLED | OUTPATIENT
Start: 2022-04-26 | End: 2022-04-26

## 2022-04-26 RX ORDER — LIDOCAINE 50 MG/G
OINTMENT TOPICAL ONCE
Status: CANCELLED | OUTPATIENT
Start: 2022-04-26 | End: 2022-04-26

## 2022-04-26 RX ORDER — GENTAMICIN SULFATE 1 MG/G
OINTMENT TOPICAL ONCE
Status: CANCELLED | OUTPATIENT
Start: 2022-04-26 | End: 2022-04-26

## 2022-04-26 NOTE — PROGRESS NOTES
[facility]          Progress Note and Procedure Note      Niall Grewal  MEDICAL RECORD NUMBER:  [id number]  AGE: 65 y.o. GENDER: male  : 1960  EPISODE DATE:  2022    Subjective:     Chief Complaint   Patient presents with    Wound Check         HISTORY of PRESENT ILLNESS HPI     Niall Grewal is a 65 y.o. male New patient referred by self, who presents today for wound/ulcer evaluation. Patient was last treated by Dr. Sheldon Hill in 2016. History of Wound Context: Abrasion noted to left medial ankle. Wound/Ulcer Pain Timing/Severity: intermittent  Quality of pain: dull, aching  Severity:  3 / 10   Modifying Factors: Pain worsens with Palpation  Associated Signs/Symptoms: edema, drainage and numbness    Interval History:   Patient presents today for follow up on wound/ulcer's progression. The patient is currently on antibiotics. Patient states that he was wearing his shoes around [facility] and created an abrasion to the medial aspect of his left ankle. Patient's shoes are very worn down. He presented to the emergency department for wound care treatment. He was given 2 antibiotics and dressings were applied. Patient was encouraged to follow-up in [facility]. Current dressing care includes nonadherent dressing and DSD that was applied while in the ED on 2022. Patient states that he has had Unna boots applied to his BLE with great results. He denies current nausea, vomiting, fever, chills, chest pain or shortness of breath at this time. Patient states while he was putting socks on his right great toenail was torn a few days ago. Patient denies other pedal problems at this time.         PAST MEDICAL HISTORY        Diagnosis Date    Cellulitis     Depressed     had in high school    Diabetes mellitus ([facility] [address]     Hyperlipidemia     Hypertension        PAST SURGICAL HISTORY    Past Surgical History:   Procedure Laterality Date    EYE SURGERY      laser    FOOT SURGERY      VEIN SURGERY Left 2014       FAMILY HISTORY    Family History   Problem Relation Age of Onset    Arthritis Mother     Diabetes Mother     High Blood Pressure Mother     Vision Loss Mother     Arthritis Father     Heart Disease Father     Early Death Father     Stroke Father     High Blood Pressure Father     High Cholesterol Father     Arthritis Brother     High Blood Pressure Brother     Cancer Maternal Grandmother     Cancer Maternal Grandfather        SOCIAL HISTORY    Social History     Tobacco Use    Smoking status: Never Smoker    Smokeless tobacco: Never Used   Vaping Use    Vaping Use: Never used   Substance Use Topics    Alcohol use: Yes     Comment: social     Drug use: No       ALLERGIES    No Known Allergies    MEDICATIONS    Current Outpatient Medications on File Prior to Encounter   Medication Sig Dispense Refill    chlorhexidine (HIBICLENS) 4 % external liquid Apply topically daily as needed. 236 mL 0    SM ASPIRIN ADULT LOW STRENGTH 81 MG EC tablet take 1 tablet by mouth once daily      prasugrel (EFFIENT) 10 MG TABS Prasugrel (Effient) 10 mg Tablet Active 10 MG PO Daily 30 April 15th, 2021 3:38pm      metoprolol succinate (TOPROL XL) 25 MG extended release tablet take 1 tablet by mouth once daily      atorvastatin (LIPITOR) 10 MG tablet Atorvastatin (Lipitor) 10 mg Tablet Active 10 MG PO Daily in the evening April 14th, 2021 9:52am      metFORMIN (GLUCOPHAGE) 500 MG tablet Take 500 mg by mouth 2 times daily (with meals)      lisinopril (ZESTRIL) 20 MG tablet Take 20 mg by mouth daily.  amLODIPine (NORVASC) 10 MG tablet Take 1 tablet by mouth daily. 30 tablet 3    glimepiride (AMARYL) 4 MG tablet Take 4 mg by mouth every morning (before breakfast).  Ascorbic Acid (VITAMIN C) 500 MG tablet Take 500 mg by mouth daily. No current facility-administered medications on file prior to encounter.        REVIEW OF SYSTEMS    Pertinent items are noted in HPI.    Objective:      Temp 97.2 °F (36.2 °C) (Infrared)     Wt Readings from Last 3 Encounters:   04/16/22 300 lb (136.1 kg)   04/17/20 291 lb (132 kg)   02/12/20 291 lb (132 kg)       PHYSICAL EXAM    General Appearance: alert and oriented to person, place and time    Vasc: DP/PT pulses palpable bilaterally. CFT brisk to bilateral digits. Hemosiderin deposits noted to bilateral lower extremity, left greater than right. Lower extremity lymphedema, left greater than right. Diffuse varicosities noted to BLE. Derm: Full-thickness ulcer noted to medial aspect of the left ankle with fibrogranular base. Surrounding tissue edematous. No active drainage, purulence, erythema, malodor, necrosis or PTB noted at this time. Hypergranular tissue noted to dorsal aspect of right great toe nailbed postdebridement of nails. Thickened, elongated, dystrophic and discolored nails with subungual debris x10. Subungual debris of right great toe malodorous. No active drainage, purulence, necrosis or PTB noted at this time. HPK noted to right 1st medial IPJ. Neuro: Light touch sensation diminished to bilateral lower extremity. MSK: No pain or tenderness with palpation of bilateral lower extremity wounds. Bilateral equinus noted.     Medial LLE predebridement      Medial LLE post debridement      Dorsal right great toe postdebridement and after silver nitrate application            Wound 02/19/14 Ankle Left;Proximal #1 left proximal medial ankle (Active)   Dressing Status Intact 10/04/16 1540   Dressing Changed Changed/New 10/04/16 1540   Dressing/Treatment Other (Comment) 10/03/16 1318   Wound Cleansed Rinsed/Irrigated with saline 10/04/16 1540   Dressing Change Due 05/14/14 05/07/14 1100   Wound Length (cm) 0 cm 10/18/16 1559   Wound Width (cm) 0 cm 10/18/16 1559   Wound Depth (cm)  0 10/18/16 1559   Calculated Wound Size (cm^2) (l*w) 0 cm^2 10/18/16 1559   Change in Wound Size % (l*w) 100 10/18/16 1559   Wound Assessment Other (Comment) 05/07/14 1100   Drainage Amount Moderate 10/04/16 1540   Drainage Description Serous 10/04/16 1540   Odor None 10/04/16 1540   Margins Attached edges 10/04/16 1540   Brittni-wound Assessment Edema 04/01/15 1234   [place]%Wound Bed 90 10/04/16 1540   Red%Wound Bed 5 10/03/16 1318   Yellow%Wound Bed 10 10/04/16 1540   Time out Yes 09/13/16 1340   Procedural Pain 0 09/13/16 1340   Post procedural Pain 0 09/13/16 1340   Number of days: 2988       Wound 04/26/22 Ankle Left;Medial #1 (Active)   Wound Image   04/26/22 1433   Wound Etiology Venous 04/26/22 1433   Wound Cleansed Cleansed with saline 04/26/22 1433   Offloading for Diabetic Foot Ulcers Offloading not ordered 04/26/22 1433   Wound Length (cm) 1.9 cm 04/26/22 1433   Wound Width (cm) 1.8 cm 04/26/22 1433   Wound Depth (cm) 0.1 cm 04/26/22 1433   Wound Surface Area (cm^2) 3.42 cm^2 04/26/22 1433   Wound Volume (cm^3) 0.342 cm^3 04/26/22 1433   Wound Assessment Granulation tissue;Slough;Fibrinous 04/26/22 1433   Drainage Amount Moderate 04/26/22 1433   Drainage Description Serosanguinous; Yellow 04/26/22 1433   Odor None 04/26/22 1433   Brittni-wound Assessment Hyperpigmented;Hypopigmented;Blanchable erythema;Edematous; Other (Comment) 04/26/22 1433   Margins Attached edges 04/26/22 1433   Wound Thickness Description not for Pressure Injury Full thickness 04/26/22 1433   Number of days: 0       LABS      CBC:   Lab Results   Component Value Date    WBC 6.5 03/08/2015    HGB 12.0 03/08/2015    HCT 36.0 03/08/2015    MCV 88.7 03/08/2015     03/08/2015     BMP:   Lab Results   Component Value Date     03/08/2015    K 4.3 03/08/2015     03/08/2015    CO2 24 03/08/2015    BUN 10 03/08/2015    CREATININE 0.7 03/08/2015     PT/INR:   Lab Results   Component Value Date    INR 0.99 03/08/2015     Prealbumin: No results found for: PREALBUMIN  Albumin:No results found for: LABALBU  Sed Rate:No results found for: SEDRATE  CRP: No results found for: CRP  Micro: No results found for: BC   Hemoglobin A1C:   Lab Results   Component Value Date    LABA1C 6.5 03/07/2015       Assessment:     Ulcer Identification:  Ulcer Type: traumatic  Contributing Factors: edema, venous stasis, lymphedema, diabetes, chronic pressure, decreased mobility, shear force and obesity    Wound: Abrasion medial left ankle and hypergranular tissue noted to dorsal right great toe wound bed    Depth of Diabetic/Pressure/Non Pressure Ulcers or Wound:  Wound, full thickness    Patient Active Problem List   Diagnosis Code    Chronic venous hypertension w ulceration (HCC) I87.319, L97.909    Ulcer of ankle ([facility] [address] L97.309    Diabetes mellitus, type 2 (HCC) E11.9    Varicosities of leg I83.90    Chronic venous hypertension with complication T46.744    Cellulitis of left lower extremity L03. 80    Benign essential HTN I10       Procedure Note  Indications:  Based on my examination of this patient's wound(s)/ulcer(s) today, debridement is required to promote healing and evaluate the extent healing. Performed by: Sheyla Lugo DPM    Consent obtained: Yes    Time out taken:  Yes    Pain control: Neuropathy      Debridement:Excisional Debridement    Using curette and tissue nippers the wound(s)/ulcer(s) was/were sharply debrided down through and including the removal of epidermis, dermis and subcutaneous tissue. Devitalized Tissue Debrided:  fibrin, biofilm, exudate and hypergranular tissue    Pre Debridement Measurements:  Are located in the Wound/Ulcer Documentation Flow Sheet    Wound/Ulcer #: 2    Post Debridement Measurements:    Wound/Ulcer Descriptions are listed under Physical Exam above.     Wound/Ulcer Descriptions are Pre Debridement except measurements    Percent of Wound/Ulcer Debrided: 100%    Total Surface Area Debrided:  3.5 sq cm     Bleeding:  Minimal    Hemostasis Achieved:  by silver nitrate stick, to dorsal right great toe    Procedural Pain:  0  / 10     Post Procedural Pain:  0 / 10     Response to treatment:  Well tolerated by patient. Plan:     Patient examined and evaluated  HPK noted to right 1st medial IPJ debrided with a 15 blade WOI  Nails debrided x10. Hypergranular tissue noted underneath right great toenail. Hypergranular tissue debrided and silver nitrate applied. Monitor wound bed  Medial LLE wound debrided, procedure note above  Betadine and Mepitel Ag applied to dorsal aspect of right great toe nailbed and dress with DSD. Mepitel Ag and alginate to medial left ankle wound. Apply Unna boots and coban to bilateral lower extremities  Instructed patient to keep dressings clean, dry and intact  HH to change dressings 2x/week. Patient has requested [facility] HH due to previous relationship  Ordered hemoglobin A1c, diabetic shoes and home health to provide dressing changes 2 times per week and OT to lymphedema pump training  Encouraged patient to complete full course of antibiotics prescribed by ED  Follow-up in 3 weeks or sooner prn    Treatment:   Orders Placed This Encounter   Procedures    Hemoglobin A1C     Standing Status:   Future     Standing Expiration Date:   4/26/2023    External Referral To Home Health     Referral Priority:   Routine     Referral Type:   Home Health Care     Referral Reason:   Specialty Services Required     Requested Specialty:   [address]     Number of Visits Requested:   1    Debride wound     Standing Status:   Standing     Number of Occurrences:   1    Debride nail     Standing Status:   Standing     Number of Occurrences:   1    DME Order for (Specify) as OP     - DME device ordered - Diabetic shoes with custom orthotics   - Diagnosis: Diabetic Neuropathy, Diabetic Ulceration left  - Length of Need: 12 Months         Antibiotics: Yes    Follow up: 3 weeks    Please see attached Discharge Instructions    Written patient dismissal instructions given to patient and signed by patient or POA. Discharge Instructions       Visit Discharge/Physician Orders:  - Toenails trimmed today   - callous of right foot paired today   - left ankle wound debrided today  - finish antibiotics as prescribed  - HGB A1C ordered today. - elevate throughout the day to help with swelling.   - use your lymphadema pumps at least 1 hour a day  - script for diabetic shoes and inserts   - referral to [facility]. Home Care:    Wound Location: Left Medial Ankle     Dressing orders:     1) Gather wound care supplies and arrange on clean table. 2) Wash your hands with soap and water or use alcohol based hand  for 20 seconds (sing \"Happy Birthday\" twice). 3) Cleanse wounds with normal saline or wound cleanser and gauze. Pat dry with clean gauze. 4) Left Medial Ankle -  Remove old unna boots to left lower legs. Wash legs with warm soapy water. Pat dry. Cleanse wound with normal saline or wound cleanser and gauze. Pat dry with clean gauze. Apply silver alginate to wound bed. Apply unna boots then ABD then kerlix then coban to left lower legs from base of toes to 1-2 inches below bend of knee. Home health to change 2 times a week. Right leg- Remove old unna boots to right lower legs. Wash legs with warm soapy water. Pat dry. Cleanse wound with normal saline or wound cleanser and gauze. Pat dry with clean gauze. Apply unna boots then coban to right lower legs from base of toes to 1-2 inches below bend of knee. Home health to change 2 times a week. If unna boot becomes too tight- raise/elevate legs above the level of the heart for 15-20 minutes if swelling does not go down then carefully cut off unna boot and call clinic or go to local ER or family physician. If unna boot becomes wet or starts to roll down then carefully remove unna boot and call clinic. Right great toe- Apply Mepitel Ag to wound. Wrap with dry dressing. Change 2 times a week. Keep all dressings clean & dry.     Do not shower, take baths or get wound wet, unless otherwise instructed by your Wound Care doctor. Follow up visit:  3  Weeks May 17th at 2:00pm  Keep next scheduled appointment. Please give 24 hour notice if unable to keep appointment. [phone]    If you experience any of the following, please call the Wound Care Service during business hours: Monday through Friday 8:00 am - 4:30 pm  ([phone]). *Increase in pain   *Temperature over 101   *Increase in drainage from your wound or a foul odor   *Uncontrolled swelling   *Need for compression bandage changes due to slippage, breakthrough drainage    If you need medical attention outside of business hours, please contact your Primary Care Doctor or go to the nearest emergency room.                    Electronically signed by Susie Ontiveros DPM on 4/26/2022 at 4:12 PM

## 2022-04-26 NOTE — PLAN OF CARE
Problem: Skin/Tissue Integrity  Goal: Absence of new skin breakdown  Description: 1. Monitor for areas of redness and/or skin breakdown  2. Assess vascular access sites hourly  3. Every 4-6 hours minimum:  Change oxygen saturation probe site  4. Every 4-6 hours:  If on nasal continuous positive airway pressure, respiratory therapy assess nares and determine need for appliance change or resting period. Outcome: Progressing  Note: New Patient seen today for left ankle wound. Wound debrided today. Toenails trimmed today. Callous to right foot paired today. No s/s of infection noted. Bilateral unna boots applied today. Referral to [facility] health made today. Follow up 4 weeks. See AVS for order changes. Care plan reviewed with patient. Patient verbalize understanding of the plan of care and contribute to goal setting.

## 2022-04-26 NOTE — PROGRESS NOTES
Teaching Note:    I was present with the resident during the visit. I discussed the case with the resident and agree with the findings and the plan as documented in the residents note.     KATE Rodriguez, [address] [place] [address] Surgery       [facility]

## 2022-04-26 NOTE — PROGRESS NOTES
[employer] Application   Below Knee    NAME:  Natty Shabazz  YOB: 1960  MEDICAL RECORD NUMBER:  [id number]  DATE:  4/26/2022    Martha Liu boot: Appied primary and secondary dressing as ordered. Applied Unna roll from toes to knee overlapping each time. Applied ace wrap or coban from toes to below the knee. Secured with tape and/or metal clips covered with tape. Instructed patient/caregiver to keep dressing dry and intact. DO NOT REMOVE DRESSING. Instructed pt/family/caregiver to report excessive draining, loose bandage, wet dressing, severe pain or tingling in toes. Applied [place] dressing below the knee to right lower leg. Applied [place] dressing below the knee to left lower leg. Unna Boot(s) were applied per  Guidelines.      Electronically signed by Shane Woo RN on 4/26/2022 at [id number]

## 2022-04-27 ENCOUNTER — TELEPHONE (OUTPATIENT)
Dept: WOUND CARE | Age: 62
End: 2022-04-27

## 2022-04-27 NOTE — TELEPHONE ENCOUNTER
----- Message from Kenna Haq sent at 4/27/2022 11:07 AM EDT -----  Rodrig[address] [phone] [facility] SAYS THE FACE TO [address] [place] [address] AIDE FOR BILATERAL DRESSING CHANGES, HOWEVER THE AIDE DOES NOT DO DRESSING CHANGES, ONLY PERSONAL CARE, SO NEEDS UPDATED TO SAY SKILLED NURSING.  ALSO NEED HOW AND FREQUENCY OF DRESSING CHANGES

## 2022-05-02 ENCOUNTER — TELEPHONE (OUTPATIENT)
Dept: WOUND CARE | Age: 62
End: 2022-05-02

## 2022-05-02 NOTE — TELEPHONE ENCOUNTER
----- Message from Samuel Reyes sent at 5/2/2022 10:53 AM EDT -----  Zay Chandler [phone] [facility]AL[facility] @ [address] [place] [address], ASKED IF WE CAN FAX TO THEM THE OFFICE NOTES FROM ABBY'S VISIT ON 4/26

## 2022-05-17 ENCOUNTER — HOSPITAL ENCOUNTER (OUTPATIENT)
Dept: WOUND CARE | Age: 62
Discharge: HOME | End: 2022-05-17
Payer: MEDICAID

## 2022-05-17 ENCOUNTER — NURSE ONLY (OUTPATIENT)
Dept: LAB | Age: 62
End: 2022-05-17

## 2022-05-17 VITALS
WEIGHT: 283 LBS | BODY MASS INDEX: 40.52 KG/M2 | HEART RATE: 91 BPM | TEMPERATURE: 97.3 F | HEIGHT: 70 IN | DIASTOLIC BLOOD PRESSURE: 88 MMHG | OXYGEN SATURATION: 94 % | SYSTOLIC BLOOD PRESSURE: 142 MMHG | RESPIRATION RATE: 16 BRPM

## 2022-05-17 DIAGNOSIS — L97.909 CHRONIC VENOUS HYPERTENSION W ULCERATION (HCC): ICD-10-CM

## 2022-05-17 DIAGNOSIS — E11.622 TYPE 2 DIABETES MELLITUS WITH OTHER SKIN ULCER, UNSPECIFIED WHETHER LONG TERM INSULIN USE (HCC): ICD-10-CM

## 2022-05-17 DIAGNOSIS — L98.499 TYPE 2 DIABETES MELLITUS WITH OTHER SKIN ULCER, UNSPECIFIED WHETHER LONG TERM INSULIN USE (HCC): ICD-10-CM

## 2022-05-17 DIAGNOSIS — L03.116 CELLULITIS OF LEFT LOWER EXTREMITY: ICD-10-CM

## 2022-05-17 DIAGNOSIS — I87.319 CHRONIC VENOUS HYPERTENSION W ULCERATION (HCC): ICD-10-CM

## 2022-05-17 DIAGNOSIS — L97.301: ICD-10-CM

## 2022-05-17 DIAGNOSIS — L97.322 SKIN ULCER OF LEFT ANKLE WITH FAT LAYER EXPOSED (HCC): ICD-10-CM

## 2022-05-17 DIAGNOSIS — I87.393 CHRONIC VENOUS HYPERTENSION WITH COMPLICATION INVOLVING BOTH SIDES: Primary | ICD-10-CM

## 2022-05-17 LAB
AVERAGE GLUCOSE: 159 MG/DL (ref 70–126)
HBA1C MFR BLD: 7.3 % (ref 4.4–6.4)

## 2022-05-17 PROCEDURE — 29580 STRAPPING UNNA BOOT: CPT

## 2022-05-17 PROCEDURE — 97597 DBRDMT OPN WND 1ST 20 CM/<: CPT

## 2022-05-17 RX ORDER — LIDOCAINE HYDROCHLORIDE 20 MG/ML
JELLY TOPICAL ONCE
Status: CANCELLED | OUTPATIENT
Start: 2022-05-17 | End: 2022-05-17

## 2022-05-17 RX ORDER — CLOBETASOL PROPIONATE 0.5 MG/G
OINTMENT TOPICAL ONCE
Status: CANCELLED | OUTPATIENT
Start: 2022-05-17 | End: 2022-05-17

## 2022-05-17 RX ORDER — BACITRACIN, NEOMYCIN, POLYMYXIN B 400; 3.5; 5 [USP'U]/G; MG/G; [USP'U]/G
OINTMENT TOPICAL ONCE
Status: CANCELLED | OUTPATIENT
Start: 2022-05-17 | End: 2022-05-17

## 2022-05-17 RX ORDER — LIDOCAINE HYDROCHLORIDE 40 MG/ML
SOLUTION TOPICAL ONCE
Status: CANCELLED | OUTPATIENT
Start: 2022-05-17 | End: 2022-05-17

## 2022-05-17 RX ORDER — GENTAMICIN SULFATE 1 MG/G
OINTMENT TOPICAL ONCE
Status: CANCELLED | OUTPATIENT
Start: 2022-05-17 | End: 2022-05-17

## 2022-05-17 RX ORDER — LIDOCAINE 50 MG/G
OINTMENT TOPICAL ONCE
Status: CANCELLED | OUTPATIENT
Start: 2022-05-17 | End: 2022-05-17

## 2022-05-17 RX ORDER — LIDOCAINE 40 MG/G
CREAM TOPICAL ONCE
Status: CANCELLED | OUTPATIENT
Start: 2022-05-17 | End: 2022-05-17

## 2022-05-17 RX ORDER — BACITRACIN ZINC AND POLYMYXIN B SULFATE 500; 1000 [USP'U]/G; [USP'U]/G
OINTMENT TOPICAL ONCE
Status: CANCELLED | OUTPATIENT
Start: 2022-05-17 | End: 2022-05-17

## 2022-05-17 RX ORDER — GINSENG 100 MG
CAPSULE ORAL ONCE
Status: CANCELLED | OUTPATIENT
Start: 2022-05-17 | End: 2022-05-17

## 2022-05-17 RX ORDER — BETAMETHASONE DIPROPIONATE 0.05 %
OINTMENT (GRAM) TOPICAL ONCE
Status: CANCELLED | OUTPATIENT
Start: 2022-05-17 | End: 2022-05-17

## 2022-05-17 NOTE — PROGRESS NOTES
[employer] Application   Below Knee    NAME:  Piero Esparza  YOB: 1960  MEDICAL RECORD NUMBER:  [id number]  DATE:  5/17/2022    Guille toledo: Appied primary and secondary dressing as ordered. Applied Unna roll from toes to knee overlapping each time. Applied ace wrap or coban from toes to below the knee. Secured with tape and/or metal clips covered with tape. Instructed patient/caregiver to keep dressing dry and intact. DO NOT REMOVE DRESSING. Instructed pt/family/caregiver to report excessive draining, loose bandage, wet dressing, severe pain or tingling in toes. Applied [place] dressing below the knee to left lower leg. Unna Boot(s) were applied per  Guidelines.      Electronically signed by Diana Hinojosa RN on 5/17/2022 at 3:34 PM

## 2022-05-17 NOTE — PROGRESS NOTES
[facility]          Progress Note and Procedure Note      Alessandro Marsh  MEDICAL RECORD NUMBER:  [id number]  AGE: 65 y.o. GENDER: male  : 1960  EPISODE DATE:  2022    Subjective:     Chief Complaint   Patient presents with    Wound Check         HISTORY of PRESENT ILLNESS HPI     Alessandro Marsh is a 65 y.o. male New patient referred by self, who presents today for wound/ulcer evaluation. Patient was last treated by Dr. Carlo Amezquita in 2016. History of Wound Context: With ulceration bilateral legs had an Unna boot placed and did have a small wound on his right great toe today is returning up his pain is reduced and swelling improved he is received compression pumps in the mail has not remove them from the box yet  Wound/Ulcer Pain Timing/Severity: intermittent  Quality of pain: dull, aching  Severity:  3 / 10   Modifying Factors: Pain worsens with Palpation  Associated Signs/Symptoms: edema, drainage and numbness    Interval History:   Patient presents today for follow up on wound/ulcer's progression. The patient is currently on antibiotics. Patient denies other pedal problems at this time.         PAST MEDICAL HISTORY        Diagnosis Date    Cellulitis     Depressed     had in high school    Diabetes mellitus ([facility] [address]     Hyperlipidemia     Hypertension        PAST SURGICAL HISTORY    Past Surgical History:   Procedure Laterality Date    EYE SURGERY      laser    FOOT SURGERY      VEIN SURGERY Left        FAMILY HISTORY    Family History   Problem Relation Age of Onset    Arthritis Mother     Diabetes Mother     High Blood Pressure Mother     Vision Loss Mother     Arthritis Father     Heart Disease Father     Early Death Father     Stroke Father     High Blood Pressure Father     High Cholesterol Father     Arthritis Brother     High Blood Pressure Brother     Cancer Maternal Grandmother     Cancer Maternal Grandfather        SOCIAL HISTORY    Social History     Tobacco Use    Smoking status: Never Smoker    Smokeless tobacco: Never Used   Vaping Use    Vaping Use: Never used   Substance Use Topics    Alcohol use: Yes     Comment: social     Drug use: No       ALLERGIES    No Known Allergies    MEDICATIONS    Current Outpatient Medications on File Prior to Encounter   Medication Sig Dispense Refill    SM ASPIRIN ADULT LOW STRENGTH 81 MG EC tablet take 1 tablet by mouth once daily      prasugrel (EFFIENT) 10 MG TABS Prasugrel (Effient) 10 mg Tablet Active 10 MG PO Daily 30 April 15th, 2021 3:38pm      metoprolol succinate (TOPROL XL) 25 MG extended release tablet take 1 tablet by mouth once daily      atorvastatin (LIPITOR) 10 MG tablet Atorvastatin (Lipitor) 10 mg Tablet Active 10 MG PO Daily in the evening April 14th, 2021 9:52am      metFORMIN (GLUCOPHAGE) 500 MG tablet Take 500 mg by mouth 2 times daily (with meals)      lisinopril (ZESTRIL) 20 MG tablet Take 20 mg by mouth daily.  amLODIPine (NORVASC) 10 MG tablet Take 1 tablet by mouth daily. 30 tablet 3    glimepiride (AMARYL) 4 MG tablet Take 4 mg by mouth every morning (before breakfast).  Ascorbic Acid (VITAMIN C) 500 MG tablet Take 500 mg by mouth daily. No current facility-administered medications on file prior to encounter. REVIEW OF SYSTEMS    Pertinent items are noted in HPI. Objective:      BP (!) 142/88   Pulse 91   Temp 97.3 °F (36.3 °C) (Infrared)   Resp 16   Ht 5' 10\" (1.778 m)   Wt 283 lb (128.4 kg)   SpO2 94%   BMI 40.61 kg/m²     Wt Readings from Last 3 Encounters:   05/17/22 283 lb (128.4 kg)   04/16/22 300 lb (136.1 kg)   04/17/20 291 lb (132 kg)       PHYSICAL EXAM    General Appearance: alert and oriented to person, place and time    Vasc: DP/PT pulses palpable bilaterally. CFT brisk to bilateral digits. Hemosiderin deposits noted to bilateral lower extremity, left greater than right.   Lower extremity lymphedema, left greater than right. Diffuse varicosities noted to BLE. Derm: Ulceration to the medial aspect of the left leg there is some nonviable tissue to the periwound area and necrotic tissue and slough on the base of the wound upon debridement to be able to remove all of the nonviable tissue there is a good granular dermal base and healing tissue there is no signs of any cellulitis there is a lot of hyperpigmentation secondary to the chronic venous hypertension and the varicosities and there is increased size to the supra malleoli are leg however there is a lot less pitting edema it is more just a large swelling that is present with a lot of hyperpigmentation circumferentially there is even some hyperpigmentation to the dorsal aspect of the left foot looking at the right there is a less discoloration no open wounds the great toe has an area where there is dried nailbed no evidence of any open ulcer or anything that is causing any cellulitis or any other abnormality. Neuro: Light touch sensation diminished to bilateral lower extremity. MSK: No pain or tenderness with palpation of bilateral lower extremity wounds. Bilateral equinus noted. Wound 04/26/22 Ankle Left;Medial #1 (Active)   Wound Image   05/17/22 1444   Wound Etiology Venous 05/17/22 1424   Dressing Status Intact; New dressing applied 05/17/22 1424   Wound Cleansed Cleansed with saline 05/17/22 1424   Dressing/Treatment Roll gauze;Coban/self-adherent bandages 05/17/22 1444   Offloading for Diabetic Foot Ulcers Offloading not required 05/17/22 1424   Wound Length (cm) 0.4 cm 05/17/22 1424   Wound Width (cm) 0.2 cm 05/17/22 1424   Wound Depth (cm) 0.05 cm 05/17/22 1424   Wound Surface Area (cm^2) 0.08 cm^2 05/17/22 1424   Change in Wound Size % (l*w) 97.66 05/17/22 1424   Wound Volume (cm^3) 0.004 cm^3 05/17/22 1424   Wound Healing % 99 05/17/22 1424   Wound Assessment Granulation tissue 05/17/22 1424   Drainage Amount Small 05/17/22 1424 Drainage Description Serosanguinous 05/17/22 1424   Odor None 05/17/22 1424   Brittni-wound Assessment Dry/flaky; Hyperpigmented 05/17/22 1424   Margins Attached edges 05/17/22 1424   Wound Thickness Description not for Pressure Injury Full thickness 05/17/22 1424   Number of days: 21       LABS      CBC:   Lab Results   Component Value Date    WBC 6.5 03/08/2015    HGB 12.0 03/08/2015    HCT 36.0 03/08/2015    MCV 88.7 03/08/2015     03/08/2015     BMP:   Lab Results   Component Value Date     03/08/2015    K 4.3 03/08/2015     03/08/2015    CO2 24 03/08/2015    BUN 10 03/08/2015    CREATININE 0.7 03/08/2015     PT/INR:   Lab Results   Component Value Date    INR 0.99 03/08/2015     Prealbumin: No results found for: Evertt Levee  Albumin:No results found for: Alejandrina Ridges  Sed Rate:No results found for: SEDRATE  CRP: No results found for: CRP  Micro: No results found for: BC   Hemoglobin A1C:   Lab Results   Component Value Date    LABA1C 6.5 03/07/2015       Assessment:     Ulcer Identification:  Ulcer Type: traumatic  Contributing Factors: edema, venous stasis, lymphedema, diabetes, chronic pressure, decreased mobility, shear force and obesity    Wound: Abrasion medial left ankle and hypergranular tissue noted to dorsal right great toe wound bed    Depth of Diabetic/Pressure/Non Pressure Ulcers or Wound:  Wound, full thickness    Patient Active Problem List   Diagnosis Code    Chronic venous hypertension w ulceration ([facility] [address] I87.319, L97.909    Ulcer of ankle ([facility] [address] L97.309    Diabetes mellitus, type 2 (HCC) E11.9    Varicosities of leg I83.90    Chronic venous hypertension with complication N01.258    Cellulitis of left lower extremity L03. 116    Benign essential HTN I10    Callus of foot L84    Onychogryphosis L60.2    Contusion of nailbed of toe S90.229A       Procedure Note  Indications:  Based on my examination of this patient's wound(s)/ulcer(s) today, debridement is required to promote healing and evaluate the extent healing. Performed by: Diana Hernandez DPM    Consent obtained: Yes    Time out taken:  Yes    Pain control: Neuropathy      Debridement: Nonexcisional    Using curette and tissue nippers the wound(s)/ulcer(s) was/were sharply debrided down through and including the removal of epidermis, dermis and subcutaneous tissue. Devitalized Tissue Debrided:  fibrin, biofilm, exudate and hypergranular tissue    Pre Debridement Measurements:  Are located in the Wound/Ulcer Documentation Flow Sheet    Wound/Ulcer #: 2    Post Debridement Measurements:    Wound/Ulcer Descriptions are listed under Physical Exam above. Wound/Ulcer Descriptions are Pre Debridement except measurements    Percent of Wound/Ulcer Debrided: 100%    Total Surface Area Debrided:  3.5 sq cm     Bleeding:  Minimal    Hemostasis Achieved:  by silver nitrate stick, to dorsal right great toe    Procedural Pain:  0  / 10     Post Procedural Pain:  0 / 10     Response to treatment:  Well tolerated by patient. Plan:     Patient examined and evaluated  We did a nonexcisional debridement of the left lower extremity he will have an Unna's boot applied to the left side he will place an Ace wrap on the right side and go to his compression sock on the right he is can start using his compression pumps at the 50 to 60 minutes twice daily at 50 mmHg of pressure. Follow-up in 3 weeks or sooner prn    Treatment:   Orders Placed This Encounter   Procedures    Debridement     Non Excisional of necrotic tissue     Standing Status:   Standing     Number of Occurrences:   1    Nursing communication     Apply adaptic to wound followed by a dry  dressing. Standing Status:   Standing     Number of Occurrences:   1    Nursing communication     Apply zinc unna to left leg. Do not remove do not get wet.      Standing Status:   Standing     Number of Occurrences:   1         Antibiotics: Yes    Follow up: 3 weeks    Please see attached Discharge Instructions    Written patient dismissal instructions given to patient and signed by patient or POA. Discharge Instructions       Visit Discharge/Physician Orders:  - Elevate throughout the day to help with swelling.   - Use your lymphedema pumps, recommend twice daily for 1 hour each time. - Prescription for diabetic shoes and inserts given at last appointment. Home Care: [facility] [address]     Wound Location: Left medial ankle, Right leg swelling     Dressing orders:      1) Gather wound care supplies and arrange on clean table.      2) Wash your hands with soap and water or use alcohol based hand  for 20 seconds (sing \"Happy Birthday\" twice).    3) Left medial ankle- Remove old unna boots to left lower legs. Wash legs with warm soapy water. Pat dry. Cleanse wound with normal saline or wound cleanser and gauze. Pat dry with clean gauze. Apply mepitel ag to wound bed. Apply unna boots then ABD then kerlix then coban to left lower legs from base of toes to 1-2 inches below bend of knee. Home health to change 2 times a week.          Right leg- Apply compression stocking daily in the morning and remove at bedtime.      If unna boot becomes too tight- raise/elevate legs above the level of the heart for 15-20 minutes if swelling does not go down then carefully cut off unna boot and call clinic or go to local ER or family physician. If unna boot becomes wet or starts to roll down then carefully remove unna boot and call clinic.      Keep all dressings clean & dry.     Do not shower, take baths or get wound wet, unless otherwise instructed by your Wound Care doctor.      Follow up visit:   Tuesday June 14th at 2:30 pm    Keep next scheduled appointment. Please give 24 hour notice if unable to keep appointment.  [phone]     If you experience any of the following, please call the Wound Care Service during business hours: Monday through Friday 8:00 am - 4:30 pm ([phone]). *Increase in pain              *Temperature over 101              *Increase in drainage from your wound or a foul odor              *Uncontrolled swelling              *Need for compression bandage changes due to slippage, breakthrough drainage     If you need medical attention outside of business hours, please contact your Primary Care Doctor or go to the nearest emergency room.   Electronically signed by Heather Fonseca DPM on 5/17/2022 at 2:55 PM           Electronically signed by Heather Fonseca DPM, Mando Crawford on 5/17/2022 at 2:56 PM

## 2022-06-14 ENCOUNTER — HOSPITAL ENCOUNTER (OUTPATIENT)
Dept: WOUND CARE | Age: 62
Discharge: HOME | End: 2022-06-14
Payer: MEDICAID

## 2022-06-14 VITALS
DIASTOLIC BLOOD PRESSURE: 71 MMHG | HEART RATE: 98 BPM | SYSTOLIC BLOOD PRESSURE: 138 MMHG | OXYGEN SATURATION: 94 % | RESPIRATION RATE: 18 BRPM | TEMPERATURE: 97.8 F

## 2022-06-14 DIAGNOSIS — L03.116 CELLULITIS OF LEFT LOWER EXTREMITY: ICD-10-CM

## 2022-06-14 DIAGNOSIS — L97.909 CHRONIC VENOUS HYPERTENSION W ULCERATION (HCC): ICD-10-CM

## 2022-06-14 DIAGNOSIS — I87.319 CHRONIC VENOUS HYPERTENSION W ULCERATION (HCC): ICD-10-CM

## 2022-06-14 PROCEDURE — 99212 OFFICE O/P EST SF 10 MIN: CPT

## 2022-06-14 NOTE — PLAN OF CARE
Problem: Skin/Tissue Integrity  Goal: Absence of new skin breakdown  Description: 1. Monitor for areas of redness and/or skin breakdown  2. Assess vascular access sites hourly  3. Every 4-6 hours minimum:  Change oxygen saturation probe site  4. Every 4-6 hours:  If on nasal continuous positive airway pressure, respiratory therapy assess nares and determine need for appliance change or resting period. Outcome: Adequate for Discharge   Pt. Seen today for left leg wound see AVS for orders. Discharge from clinic. Care plan reviewed with patient. Patient verbalize understanding of the plan of care and contribute to goal setting.

## 2022-06-14 NOTE — PROGRESS NOTES
[facility]          Progress Note and Procedure Note      sAhley Heart  MEDICAL RECORD NUMBER:  [id number]  AGE: 65 y.o. GENDER: male  : 1960  EPISODE DATE:  2022    Subjective:     Chief Complaint   Patient presents with    Wound Check     left medial ankle         HISTORY of PRESENT ILLNESS HPI     Ashley Heart is a 65 y.o. male New patient referred by self, who presents today for wound/ulcer evaluation. Patient was last treated by Dr. Irasema Nuñez in 2016. History of Wound Context: With ulceration bilateral legs had an Unna boot placed and did have a small wound on his right great toe today is returning for evaluation everything seems to be healed he has a compression socks with him  Wound/Ulcer Pain Timing/Severity: intermittent  Quality of pain: dull, aching  Severity:  3  10   Modifying Factors: Pain worsens with Palpation  Associated Signs/Symptoms: edema, drainage and numbness    Interval History:   Patient presents today for follow up on wound/ulcer's progression. The patient is currently on antibiotics. Patient denies other pedal problems at this time.         PAST MEDICAL HISTORY        Diagnosis Date    Cellulitis     Depressed     had in high school    Diabetes mellitus ([address]     Hyperlipidemia     Hypertension        PAST SURGICAL HISTORY    Past Surgical History:   Procedure Laterality Date    EYE SURGERY      laser    FOOT SURGERY      VEIN SURGERY Left        FAMILY HISTORY    Family History   Problem Relation Age of Onset    Arthritis Mother     Diabetes Mother     High Blood Pressure Mother     Vision Loss Mother     Arthritis Father     Heart Disease Father     Early Death Father     Stroke Father     High Blood Pressure Father     High Cholesterol Father     Arthritis Brother     High Blood Pressure Brother     Cancer Maternal Grandmother     Cancer Maternal Grandfather        SOCIAL HISTORY    Social History     Tobacco Use    Smoking status: Never Smoker    Smokeless tobacco: Never Used   Vaping Use    Vaping Use: Never used   Substance Use Topics    Alcohol use: Yes     Comment: social     Drug use: No       ALLERGIES    No Known Allergies    MEDICATIONS    Current Outpatient Medications on File Prior to Encounter   Medication Sig Dispense Refill    SM ASPIRIN ADULT LOW STRENGTH 81 MG EC tablet take 1 tablet by mouth once daily      prasugrel (EFFIENT) 10 MG TABS Prasugrel (Effient) 10 mg Tablet Active 10 MG PO Daily 30 April 15th, 2021 3:38pm      metoprolol succinate (TOPROL XL) 25 MG extended release tablet take 1 tablet by mouth once daily      atorvastatin (LIPITOR) 10 MG tablet Atorvastatin (Lipitor) 10 mg Tablet Active 10 MG PO Daily in the evening April 14th, 2021 9:52am      metFORMIN (GLUCOPHAGE) 500 MG tablet Take 500 mg by mouth 2 times daily (with meals)      lisinopril (ZESTRIL) 20 MG tablet Take 20 mg by mouth daily.  amLODIPine (NORVASC) 10 MG tablet Take 1 tablet by mouth daily. 30 tablet 3    glimepiride (AMARYL) 4 MG tablet Take 4 mg by mouth every morning (before breakfast).  Ascorbic Acid (VITAMIN C) 500 MG tablet Take 500 mg by mouth daily. No current facility-administered medications on file prior to encounter. REVIEW OF SYSTEMS    Pertinent items are noted in HPI. Objective:      /71   Pulse 98   Temp 97.8 °F (36.6 °C) (Infrared)   Resp 18   SpO2 94%     Wt Readings from Last 3 Encounters:   05/17/22 283 lb (128.4 kg)   04/16/22 300 lb (136.1 kg)   04/17/20 291 lb (132 kg)       PHYSICAL EXAM    General Appearance: alert and oriented to person, place and time    Vasc: DP/PT pulses palpable bilaterally. CFT brisk to bilateral digits. Hemosiderin deposits noted to bilateral lower extremity, left greater than right. Lower extremity lymphedema, left greater than right. Diffuse varicosities noted to BLE.         Derm: Patient has a lot of hyperpigmentation and mottled area but the ulceration on the medial aspect of the left ankle is completely resolved and healed the ulceration on the great toe is also resolved. Neuro: Light touch sensation diminished to bilateral lower extremity. MSK: No pain or tenderness with palpation of bilateral lower extremity wounds. Bilateral equinus noted. LABS      CBC:   Lab Results   Component Value Date    WBC 6.5 03/08/2015    HGB 12.0 03/08/2015    HCT 36.0 03/08/2015    MCV 88.7 03/08/2015     03/08/2015     BMP:   Lab Results   Component Value Date     03/08/2015    K 4.3 03/08/2015     03/08/2015    CO2 24 03/08/2015    BUN 10 03/08/2015    CREATININE 0.7 03/08/2015     PT/INR:   Lab Results   Component Value Date    INR 0.99 03/08/2015     Prealbumin: No results found for: PREALBUMIN  Albumin:No results found for: Victorina Jorgensen  Sed Rate:No results found for: SEDRATE  CRP: No results found for: CRP  Micro: No results found for: BC   Hemoglobin A1C:   Lab Results   Component Value Date    LABA1C 7.3 05/17/2022       Assessment:     Ulcer Identification:  Ulcer Type: traumatic  Contributing Factors: edema, venous stasis, lymphedema, diabetes, chronic pressure, decreased mobility, shear force and obesity    Wound: Abrasion medial left ankle and hypergranular tissue noted to dorsal right great toe wound bed    Depth of Diabetic/Pressure/Non Pressure Ulcers or Wound:  Wound, full thickness    Patient Active Problem List   Diagnosis Code    Chronic venous hypertension w ulceration ([facility] [address] I87.319, L97.909    Ulcer of ankle ([facility] [address] L97.309    Diabetes mellitus, type 2 (HCC) E11.9    Varicosities of leg I83.90    Chronic venous hypertension with complication G22.595    Cellulitis of left lower extremity L03. 116    Benign essential HTN I10    Callus of foot L84    Onychogryphosis L60.2    Contusion of nailbed of toe S90.229A       Procedure Note  Indications:  Based on my examination of this patient's wound(s)/ulcer(s) today, debridement is not required to promote healing and evaluate the extent healing. Plan:     Patient examined and evaluated  We did a nonexcisional debridement of the left lower extremity he will have an Unna's boot applied to the left side he will place an Ace wrap on the right side and go to his compression sock on the right he is can start using his compression pumps at the 50 to 60 minutes twice daily at 50 mmHg of pressure. Follow-up in 3 weeks or sooner prn    Treatment:   No orders of the defined types were placed in this encounter. Antibiotics: No    Follow up: As needed    Please see attached Discharge Instructions    Written patient dismissal instructions given to patient and signed by patient or POA. Discharge Instructions       Visit Discharge/Physician Orders:  - Elevate throughout the day to help with swelling.   - Use your lymphedema pumps, recommend twice daily for 1 hour each time. - wear diabetic shoes and inserts     Home Care: [facility] ok to discharge     Wound Location: Left medial ankle, Right leg swelling     Dressing orders:      1) Gather wound care supplies and arrange on clean table.      2) Wash your hands with soap and water or use alcohol based hand  for 20 seconds (sing \"Happy Birthday\" twice).    3)    bilateral legs- Apply compression stocking daily in the morning and remove at bedtime.      monitor for increased swelling, redness,pain,itching and call with any issues.      Follow up visit:   as needed     Keep next scheduled appointment. Please give 24 hour notice if unable to keep appointment. [phone]     If you experience any of the following, please call the Wound Care Service during business hours: Monday through Friday 8:00 am - 4:30 pm  ([phone]).               *Increase in pain              *Temperature over 101              *Increase in drainage from your wound or a foul odor              *Uncontrolled swelling              *Need for compression bandage changes due to slippage, breakthrough drainage     If you need medical attention outside of business hours, please contact your Primary Care Doctor or go to the nearest emergency room.   Electronically signed by Helen Flower DPM on 6/14/2022 at 4:45 PM          Electronically signed by Helen Flower DPM, [id number] on 6/14/2022 at 4:46 PM

## 2022-07-11 ENCOUNTER — TELEPHONE (OUTPATIENT)
Dept: WOUND CARE | Age: 62
End: 2022-07-11

## 2022-07-11 NOTE — TELEPHONE ENCOUNTER
Renetta Bralizz and chadwick called and stated the order from 7/5/22 was marked from custom diabetic shoes with inserts. This is not covered by insurance. Patient will get OTS diabetic shoes with insurance. Will notify provider.

## 2023-09-06 ENCOUNTER — HOSPITAL ENCOUNTER (OUTPATIENT)
Dept: WOUND CARE | Age: 63
Discharge: HOME | End: 2023-09-06
Payer: MEDICARE

## 2023-09-06 VITALS
DIASTOLIC BLOOD PRESSURE: 71 MMHG | RESPIRATION RATE: 18 BRPM | HEART RATE: 79 BPM | SYSTOLIC BLOOD PRESSURE: 132 MMHG | OXYGEN SATURATION: 97 % | TEMPERATURE: 97.7 F

## 2023-09-06 DIAGNOSIS — R60.0 EDEMA OF LEFT LOWER LEG: ICD-10-CM

## 2023-09-06 DIAGNOSIS — R60.0 VENOUS STASIS ULCER OF LEFT LOWER LEG WITH EDEMA OF LEFT LOWER LEG (HCC): ICD-10-CM

## 2023-09-06 DIAGNOSIS — L53.9 ERYTHEMA: ICD-10-CM

## 2023-09-06 DIAGNOSIS — M79.605 PAIN IN LEFT LEG: ICD-10-CM

## 2023-09-06 DIAGNOSIS — L97.909 CHRONIC VENOUS HYPERTENSION W ULCERATION (HCC): Primary | ICD-10-CM

## 2023-09-06 DIAGNOSIS — I83.029 VENOUS STASIS ULCER OF LEFT LOWER LEG WITH EDEMA OF LEFT LOWER LEG (HCC): ICD-10-CM

## 2023-09-06 DIAGNOSIS — I89.0 LYMPHEDEMA: ICD-10-CM

## 2023-09-06 DIAGNOSIS — L97.929 VENOUS STASIS ULCER OF LEFT LOWER LEG WITH EDEMA OF LEFT LOWER LEG (HCC): ICD-10-CM

## 2023-09-06 DIAGNOSIS — I83.892 VENOUS STASIS ULCER OF LEFT LOWER LEG WITH EDEMA OF LEFT LOWER LEG (HCC): ICD-10-CM

## 2023-09-06 DIAGNOSIS — I87.319 CHRONIC VENOUS HYPERTENSION W ULCERATION (HCC): Primary | ICD-10-CM

## 2023-09-06 PROBLEM — S90.229A: Status: RESOLVED | Noted: 2022-04-26 | Resolved: 2023-09-06

## 2023-09-06 PROBLEM — G47.33 OBSTRUCTIVE SLEEP APNEA SYNDROME: Status: ACTIVE | Noted: 2023-09-06

## 2023-09-06 PROBLEM — E78.5 HYPERLIPIDEMIA: Status: ACTIVE | Noted: 2023-09-06

## 2023-09-06 PROBLEM — M19.90 ARTHRITIS: Status: ACTIVE | Noted: 2023-09-06

## 2023-09-06 PROBLEM — Z95.5 HISTORY OF CORONARY ARTERY STENT PLACEMENT: Status: ACTIVE | Noted: 2023-09-06

## 2023-09-06 PROBLEM — E03.9 HYPOTHYROIDISM: Status: ACTIVE | Noted: 2023-09-06

## 2023-09-06 PROCEDURE — 99213 OFFICE O/P EST LOW 20 MIN: CPT

## 2023-09-06 RX ORDER — LIDOCAINE 40 MG/G
CREAM TOPICAL ONCE
OUTPATIENT
Start: 2023-09-06 | End: 2023-09-06

## 2023-09-06 RX ORDER — LIDOCAINE 50 MG/G
OINTMENT TOPICAL ONCE
Status: CANCELLED | OUTPATIENT
Start: 2023-09-06 | End: 2023-09-06

## 2023-09-06 RX ORDER — CLOBETASOL PROPIONATE 0.5 MG/G
OINTMENT TOPICAL ONCE
OUTPATIENT
Start: 2023-09-06 | End: 2023-09-06

## 2023-09-06 RX ORDER — BACITRACIN ZINC AND POLYMYXIN B SULFATE 500; 1000 [USP'U]/G; [USP'U]/G
OINTMENT TOPICAL ONCE
Status: CANCELLED | OUTPATIENT
Start: 2023-09-06 | End: 2023-09-06

## 2023-09-06 RX ORDER — IBUPROFEN 200 MG
TABLET ORAL ONCE
Status: CANCELLED | OUTPATIENT
Start: 2023-09-06 | End: 2023-09-06

## 2023-09-06 RX ORDER — SODIUM CHLOR/HYPOCHLOROUS ACID 0.033 %
SOLUTION, IRRIGATION IRRIGATION ONCE
Status: CANCELLED | OUTPATIENT
Start: 2023-09-06 | End: 2023-09-06

## 2023-09-06 RX ORDER — SODIUM CHLOR/HYPOCHLOROUS ACID 0.033 %
SOLUTION, IRRIGATION IRRIGATION ONCE
OUTPATIENT
Start: 2023-09-06 | End: 2023-09-06

## 2023-09-06 RX ORDER — GENTAMICIN SULFATE 1 MG/G
OINTMENT TOPICAL ONCE
Status: CANCELLED | OUTPATIENT
Start: 2023-09-06 | End: 2023-09-06

## 2023-09-06 RX ORDER — GINSENG 100 MG
CAPSULE ORAL ONCE
Status: CANCELLED | OUTPATIENT
Start: 2023-09-06 | End: 2023-09-06

## 2023-09-06 RX ORDER — GINSENG 100 MG
CAPSULE ORAL ONCE
OUTPATIENT
Start: 2023-09-06 | End: 2023-09-06

## 2023-09-06 RX ORDER — BACITRACIN ZINC AND POLYMYXIN B SULFATE 500; 1000 [USP'U]/G; [USP'U]/G
OINTMENT TOPICAL ONCE
OUTPATIENT
Start: 2023-09-06 | End: 2023-09-06

## 2023-09-06 RX ORDER — IBUPROFEN 200 MG
TABLET ORAL ONCE
OUTPATIENT
Start: 2023-09-06 | End: 2023-09-06

## 2023-09-06 RX ORDER — LIDOCAINE HYDROCHLORIDE 20 MG/ML
JELLY TOPICAL ONCE
OUTPATIENT
Start: 2023-09-06 | End: 2023-09-06

## 2023-09-06 RX ORDER — BETAMETHASONE DIPROPIONATE 0.05 %
OINTMENT (GRAM) TOPICAL ONCE
Status: CANCELLED | OUTPATIENT
Start: 2023-09-06 | End: 2023-09-06

## 2023-09-06 RX ORDER — LIDOCAINE HYDROCHLORIDE 40 MG/ML
SOLUTION TOPICAL ONCE
OUTPATIENT
Start: 2023-09-06 | End: 2023-09-06

## 2023-09-06 RX ORDER — LIDOCAINE HYDROCHLORIDE 20 MG/ML
JELLY TOPICAL ONCE
Status: CANCELLED | OUTPATIENT
Start: 2023-09-06 | End: 2023-09-06

## 2023-09-06 RX ORDER — GENTAMICIN SULFATE 1 MG/G
OINTMENT TOPICAL ONCE
OUTPATIENT
Start: 2023-09-06 | End: 2023-09-06

## 2023-09-06 RX ORDER — BETAMETHASONE DIPROPIONATE 0.05 %
OINTMENT (GRAM) TOPICAL ONCE
OUTPATIENT
Start: 2023-09-06 | End: 2023-09-06

## 2023-09-06 RX ORDER — LIDOCAINE 40 MG/G
CREAM TOPICAL ONCE
Status: CANCELLED | OUTPATIENT
Start: 2023-09-06 | End: 2023-09-06

## 2023-09-06 RX ORDER — LIDOCAINE 50 MG/G
OINTMENT TOPICAL ONCE
OUTPATIENT
Start: 2023-09-06 | End: 2023-09-06

## 2023-09-06 RX ORDER — LIDOCAINE HYDROCHLORIDE 40 MG/ML
SOLUTION TOPICAL ONCE
Status: CANCELLED | OUTPATIENT
Start: 2023-09-06 | End: 2023-09-06

## 2023-09-06 RX ORDER — CLOBETASOL PROPIONATE 0.5 MG/G
OINTMENT TOPICAL ONCE
Status: CANCELLED | OUTPATIENT
Start: 2023-09-06 | End: 2023-09-06

## 2023-09-06 ASSESSMENT — PAIN DESCRIPTION - DESCRIPTORS: DESCRIPTORS: SORE;JABBING

## 2023-09-06 ASSESSMENT — PAIN SCALES - GENERAL: PAINLEVEL_OUTOF10: 4

## 2023-09-06 ASSESSMENT — PAIN DESCRIPTION - ORIENTATION: ORIENTATION: LEFT

## 2023-09-06 ASSESSMENT — PAIN DESCRIPTION - LOCATION: LOCATION: LEG

## 2023-09-06 NOTE — PLAN OF CARE
Problem: Wound:  Goal: Will show signs of wound healing; wound closure and no evidence of infection  Description: Will show signs of wound healing; wound closure and no evidence of infection  Outcome: Progressing   Patient seen in clinic today for bilateral legs. See AVS. Follow up in 1 week/s. Care plan reviewed with patient. Patient verbalize understanding of the plan of care and contribute to goal setting.

## 2023-09-06 NOTE — PROGRESS NOTES
[facility]   History and Physical Note   Referring Provider: Dr. Bobbi Matrin  Reason for Referral: left lower extremity cellulitis    Zelalem Fragoso  MEDICAL RECORD NUMBER:  [id number]  AGE: 59 y.o. GENDER: male  : 1960  EPISODE DATE:  2023    Chief complaint and reason for visit:     Chief Complaint   Patient presents with    Wound Check         HISTORY of PRESENT ILLNESS HPI     Zelalem Fragoso is a 59 y.o. male who presents today for an initial evaluation of a wound/ulcer. Patient is new to me but not to the wound center . Wound duration:  3 week(s). History of Wound Context: Patient presents today due to concerns for left lower extremity cellulitis. Patient reports this has been ongoing for several weeks. States he was evaluated in the ER at [facility] for this problem several weeks ago at which time he was placed on antibiotics. He reports symptoms continued, he was placed on additional course of antibiotics per his PCP. Despite this, he reports ongoing pain to his calf-worsens with flexion, redness, drainage and swelling (increased from baseline) to his left leg. He reports drainage to posterior left leg, has been applying Cocoa butter. He has extensive history of venous disease and lymphedema to legs with open wounds, has followed previously with Dr. Yulia Rivera, Dr. Angelica Maldonado, Dr. Catina Hurtado, Dr. Kai Quevedo and at the [facility] with Dr. Compa Beach.  He states he has compression stockings and lymphedema pumps at home, has not been using recently due to pain. He has history of DM, uncontrolled, last hemoglobin A1C 7.9. He denies any fevers, chills, malaise. Denies any further needs or concerns.     Pertinent associated symptoms: pain severity: constant, moderate, pain quality: aching, drainage , redness, swelling, and skin discoloration    PAST MEDICAL HISTORY        Diagnosis Date    Cellulitis     Depressed     had in high school    Diabetes mellitus ([facility])

## 2023-09-06 NOTE — DISCHARGE INSTRUCTIONS
Visit Discharge/Physician Orders:  - Bloodwork was ordered today  - venous ultrasound ordered today, scheduled for 6:30AM at vascular lab on  second floor  - will order supplies at next visit      Wound Location: No open wounds    Dressing orders:     1) Gather wound care supplies and arrange on clean table. 2) Wash your hands with soap and water or use alcohol based hand  for 20 seconds (sing \"Happy Birthday\" twice). 3) Cleanse wounds with normal saline or wound cleanser and gauze. Pat dry with clean gauze. 4) Left leg - Apply alginate to wound. Cover with ABD pad. Secure with roll gauze and tape. Change three times weekly or as needed. Keep all dressings clean & dry. Follow up visit:   1 Weeks on 9/14/23 at 3:00pm    Supplies:    Duration of dressings: n/a    We have sent your supply order to the following company:  n/a  If you don't receive the items you were expecting or don't know what the items are that you received, call the company where the order was sent. If you are unable to obtain wound supplies, continue to use the supplies you have available until you are able to reach us. It is most important to keep the wound covered at all times. It is YOUR responsibility to make sure that supplies are re-ordered before you run out. Re-order telephone numbers are included in each package. Keep next scheduled appointment. Please give 24 hour notice if unable to keep appointment. [phone]    If you experience any of the following, please call the Wound Care Service during business hours: Monday through Friday 8:00 am - 4:30 pm  ([phone]). *Increase in pain   *Temperature over 101   *Increase in drainage from your wound or a foul odor   *Uncontrolled swelling   *Need for compression bandage changes due to slippage, breakthrough drainage    If you need medical attention outside of business hours, please contact your Primary Care Doctor or go to the nearest emergency room.

## 2023-09-07 ENCOUNTER — HOSPITAL ENCOUNTER (OUTPATIENT)
Age: 63
Discharge: HOME | End: 2023-09-07
Payer: MEDICARE

## 2023-09-07 ENCOUNTER — HOSPITAL ENCOUNTER (OUTPATIENT)
Dept: INTERVENTIONAL RADIOLOGY/VASCULAR | Age: 63
Discharge: HOME | End: 2023-09-07
Payer: MEDICARE

## 2023-09-07 DIAGNOSIS — M79.605 PAIN IN LEFT LEG: ICD-10-CM

## 2023-09-07 DIAGNOSIS — L53.9 ERYTHEMA: ICD-10-CM

## 2023-09-07 DIAGNOSIS — L97.909 CHRONIC VENOUS HYPERTENSION W ULCERATION (HCC): ICD-10-CM

## 2023-09-07 DIAGNOSIS — I87.319 CHRONIC VENOUS HYPERTENSION W ULCERATION (HCC): ICD-10-CM

## 2023-09-07 DIAGNOSIS — R60.0 EDEMA OF LEFT LOWER LEG: ICD-10-CM

## 2023-09-07 LAB
ALBUMIN SERPL BCG-MCNC: 3.8 G/DL (ref 3.5–5.1)
ALP SERPL-CCNC: 71 U/L (ref 38–126)
ALT SERPL W/O P-5'-P-CCNC: 12 U/L (ref 11–66)
ANION GAP SERPL CALC-SCNC: 11 MEQ/L (ref 8–16)
AST SERPL-CCNC: 13 U/L (ref 5–40)
BASOPHILS ABSOLUTE: 0.1 THOU/MM3 (ref 0–0.1)
BASOPHILS NFR BLD AUTO: 0.9 %
BILIRUB SERPL-MCNC: 0.4 MG/DL (ref 0.3–1.2)
BUN SERPL-MCNC: 10 MG/DL (ref 7–22)
CALCIUM SERPL-MCNC: 9.2 MG/DL (ref 8.5–10.5)
CHLORIDE SERPL-SCNC: 102 MEQ/L (ref 98–111)
CO2 SERPL-SCNC: 27 MEQ/L (ref 23–33)
CREAT SERPL-MCNC: 0.7 MG/DL (ref 0.4–1.2)
CRP SERPL-MCNC: 0.68 MG/DL (ref 0–1)
DEPRECATED RDW RBC AUTO: 44.6 FL (ref 35–45)
EOSINOPHIL NFR BLD AUTO: 0.1 %
EOSINOPHILS ABSOLUTE: 0 THOU/MM3 (ref 0–0.4)
ERYTHROCYTE [DISTWIDTH] IN BLOOD BY AUTOMATED COUNT: 13.6 % (ref 11.5–14.5)
GFR SERPL CREATININE-BSD FRML MDRD: > 60 ML/MIN/1.73M2
GLUCOSE SERPL-MCNC: 127 MG/DL (ref 70–108)
HCT VFR BLD AUTO: 39.2 % (ref 42–52)
HGB BLD-MCNC: 12.4 GM/DL (ref 14–18)
IMM GRANULOCYTES # BLD AUTO: 0.03 THOU/MM3 (ref 0–0.07)
IMM GRANULOCYTES NFR BLD AUTO: 0.3 %
LYMPHOCYTES ABSOLUTE: 2.9 THOU/MM3 (ref 1–4.8)
LYMPHOCYTES NFR BLD AUTO: 32.2 %
MCH RBC QN AUTO: 28.4 PG (ref 26–33)
MCHC RBC AUTO-ENTMCNC: 31.6 GM/DL (ref 32.2–35.5)
MCV RBC AUTO: 89.7 FL (ref 80–94)
MONOCYTES ABSOLUTE: 0.8 THOU/MM3 (ref 0.4–1.3)
MONOCYTES NFR BLD AUTO: 8.6 %
NEUTROPHILS NFR BLD AUTO: 57.9 %
NRBC BLD AUTO-RTO: 0 /100 WBC
PLATELET # BLD AUTO: 459 THOU/MM3 (ref 130–400)
PMV BLD AUTO: 8.9 FL (ref 9.4–12.4)
POTASSIUM SERPL-SCNC: 4.7 MEQ/L (ref 3.5–5.2)
PROT SERPL-MCNC: 7.4 G/DL (ref 6.1–8)
RBC # BLD AUTO: 4.37 MILL/MM3 (ref 4.7–6.1)
SEGMENTED NEUTROPHILS ABSOLUTE COUNT: 5.3 THOU/MM3 (ref 1.8–7.7)
SODIUM SERPL-SCNC: 140 MEQ/L (ref 135–145)
WBC # BLD AUTO: 9.1 THOU/MM3 (ref 4.8–10.8)

## 2023-09-07 PROCEDURE — 36415 COLL VENOUS BLD VENIPUNCTURE: CPT

## 2023-09-07 PROCEDURE — 93971 EXTREMITY STUDY: CPT

## 2023-09-07 PROCEDURE — 80053 COMPREHEN METABOLIC PANEL: CPT

## 2023-09-07 PROCEDURE — 85025 COMPLETE CBC W/AUTO DIFF WBC: CPT

## 2023-09-07 PROCEDURE — 86140 C-REACTIVE PROTEIN: CPT

## 2023-09-08 ENCOUNTER — TELEPHONE (OUTPATIENT)
Dept: INFECTIOUS DISEASES | Age: 63
End: 2023-09-08

## 2023-09-08 NOTE — TELEPHONE ENCOUNTER
Called patient, reviewed testing results indicating no DVT. Bloodwork showed no indications of infection. Patient states that redness and pain are improved since visit on Wednesday. Reviewed with Dr. Cyril Chester who recommends against any further antibiotic therapy. Advised patient to start use of compression to legs in addition to wound care dressings as were discussed at last visit. Recommend he also start use of Lymphedema pumps twice daily for better control of edema. Appointment rescheduled for Monday, plan on application of unna boot. Advised patient to seek emergency care with any new or worsening symptoms prior to that time. Patient verbalized understanding and agreement  with plan. Primitivo Alcantar

## 2023-09-08 NOTE — TELEPHONE ENCOUNTER
Attempted to contact patient regarding testing results. No answer, message left requesting call back.

## 2023-09-11 ENCOUNTER — HOSPITAL ENCOUNTER (OUTPATIENT)
Dept: WOUND CARE | Age: 63
Discharge: HOME | End: 2023-09-11
Payer: MEDICARE

## 2023-09-11 VITALS
DIASTOLIC BLOOD PRESSURE: 71 MMHG | OXYGEN SATURATION: 93 % | TEMPERATURE: 97.5 F | HEART RATE: 98 BPM | RESPIRATION RATE: 18 BRPM | SYSTOLIC BLOOD PRESSURE: 135 MMHG

## 2023-09-11 DIAGNOSIS — I87.319 CHRONIC VENOUS HYPERTENSION W ULCERATION (HCC): Primary | ICD-10-CM

## 2023-09-11 DIAGNOSIS — I83.892 VENOUS STASIS ULCER OF LEFT LOWER LEG WITH EDEMA OF LEFT LOWER LEG (HCC): ICD-10-CM

## 2023-09-11 DIAGNOSIS — R60.0 VENOUS STASIS ULCER OF LEFT LOWER LEG WITH EDEMA OF LEFT LOWER LEG (HCC): ICD-10-CM

## 2023-09-11 DIAGNOSIS — I83.029 VENOUS STASIS ULCER OF LEFT LOWER LEG WITH EDEMA OF LEFT LOWER LEG (HCC): ICD-10-CM

## 2023-09-11 DIAGNOSIS — L03.116 CELLULITIS OF LEFT LOWER EXTREMITY: ICD-10-CM

## 2023-09-11 DIAGNOSIS — L97.929 VENOUS STASIS ULCER OF LEFT LOWER LEG WITH EDEMA OF LEFT LOWER LEG (HCC): ICD-10-CM

## 2023-09-11 DIAGNOSIS — L97.909 CHRONIC VENOUS HYPERTENSION W ULCERATION (HCC): Primary | ICD-10-CM

## 2023-09-11 PROCEDURE — 29580 STRAPPING UNNA BOOT: CPT

## 2023-09-11 RX ORDER — LIDOCAINE HYDROCHLORIDE 20 MG/ML
JELLY TOPICAL ONCE
OUTPATIENT
Start: 2023-09-11 | End: 2023-09-11

## 2023-09-11 RX ORDER — LIDOCAINE HYDROCHLORIDE 40 MG/ML
SOLUTION TOPICAL ONCE
OUTPATIENT
Start: 2023-09-11 | End: 2023-09-11

## 2023-09-11 RX ORDER — SODIUM CHLOR/HYPOCHLOROUS ACID 0.033 %
SOLUTION, IRRIGATION IRRIGATION ONCE
OUTPATIENT
Start: 2023-09-11 | End: 2023-09-11

## 2023-09-11 RX ORDER — LIDOCAINE 50 MG/G
OINTMENT TOPICAL ONCE
OUTPATIENT
Start: 2023-09-11 | End: 2023-09-11

## 2023-09-11 RX ORDER — BACITRACIN ZINC AND POLYMYXIN B SULFATE 500; 1000 [USP'U]/G; [USP'U]/G
OINTMENT TOPICAL ONCE
OUTPATIENT
Start: 2023-09-11 | End: 2023-09-11

## 2023-09-11 RX ORDER — LIDOCAINE 40 MG/G
CREAM TOPICAL ONCE
OUTPATIENT
Start: 2023-09-11 | End: 2023-09-11

## 2023-09-11 RX ORDER — IBUPROFEN 200 MG
TABLET ORAL ONCE
OUTPATIENT
Start: 2023-09-11 | End: 2023-09-11

## 2023-09-11 RX ORDER — GINSENG 100 MG
CAPSULE ORAL ONCE
OUTPATIENT
Start: 2023-09-11 | End: 2023-09-11

## 2023-09-11 RX ORDER — CLOBETASOL PROPIONATE 0.5 MG/G
OINTMENT TOPICAL ONCE
OUTPATIENT
Start: 2023-09-11 | End: 2023-09-11

## 2023-09-11 RX ORDER — BETAMETHASONE DIPROPIONATE 0.05 %
OINTMENT (GRAM) TOPICAL ONCE
OUTPATIENT
Start: 2023-09-11 | End: 2023-09-11

## 2023-09-11 RX ORDER — GENTAMICIN SULFATE 1 MG/G
OINTMENT TOPICAL ONCE
OUTPATIENT
Start: 2023-09-11 | End: 2023-09-11

## 2023-09-11 RX ORDER — SULFAMETHOXAZOLE AND TRIMETHOPRIM 800; 160 MG/1; MG/1
1 TABLET ORAL 2 TIMES DAILY
Qty: 28 TABLET | Refills: 0 | Status: SHIPPED | OUTPATIENT
Start: 2023-09-11 | End: 2023-09-25

## 2023-09-11 ASSESSMENT — PAIN DESCRIPTION - DESCRIPTORS: DESCRIPTORS: SORE

## 2023-09-11 ASSESSMENT — PAIN DESCRIPTION - ORIENTATION: ORIENTATION: LEFT

## 2023-09-11 ASSESSMENT — PAIN SCALES - GENERAL: PAINLEVEL_OUTOF10: 4

## 2023-09-11 ASSESSMENT — PAIN DESCRIPTION - LOCATION: LOCATION: LEG

## 2023-09-11 NOTE — DISCHARGE INSTRUCTIONS
*Increase in pain              *Temperature over 101              *Increase in drainage from your wound or a foul odor              *Uncontrolled swelling              *Need for compression bandage changes due to slippage, breakthrough drainage     If you need medical attention outside of business hours, please contact your Primary Care Doctor or go to the nearest emergency room.

## 2023-09-11 NOTE — PROGRESS NOTES
[employer] Application   Below Knee    NAME:  Poppy Rasheed  YOB: 1960  MEDICAL RECORD NUMBER:  [id number]  DATE:  9/11/2023    Stiven Levine boot: Applied moisturizing agent to dry skin as needed. Appied primary and secondary dressing as ordered. Applied Unna roll from toes to knee overlapping each time. Applied ace wrap or coban from toes to below the knee. Secured with tape and/or metal clips covered with tape. Instructed patient/caregiver to keep dressing dry and intact. DO NOT REMOVE DRESSING. Instructed pt/family/caregiver to report excessive draining, loose bandage, wet dressing, severe pain or tingling in toes. Applied [place] dressing below the knee to left lower leg. Unna Boot(s) were applied per  Guidelines.      Electronically signed by Genevieve Jorgensen RN on 9/11/2023 at 2:18 PM
Diagnosis Date    Cellulitis     Depressed     had in high school    Diabetes mellitus ([address])     Hyperlipidemia     Hypertension        PAST SURGICAL HISTORY  Past Surgical History:   Procedure Laterality Date    EYE SURGERY      laser    FOOT SURGERY      VEIN SURGERY Left 2014       FAMILY HISTORY  Family History   Problem Relation Age of Onset    Arthritis Mother     Diabetes Mother     High Blood Pressure Mother     Vision Loss Mother     Arthritis Father     Heart Disease Father     Early Death Father     Stroke Father     High Blood Pressure Father     High Cholesterol Father     Arthritis Brother     High Blood Pressure Brother     Cancer Maternal Grandmother     Cancer Maternal Grandfather        SOCIAL HISTORY  Social History     Tobacco Use    Smoking status: Never     Passive exposure: Never    Smokeless tobacco: Never   Vaping Use    Vaping Use: Never used   Substance Use Topics    Alcohol use: Yes     Comment: social     Drug use: No       ALLERGIES  No Known Allergies    MEDICATIONS  Current Outpatient Medications on File Prior to Encounter   Medication Sig Dispense Refill    SM ASPIRIN ADULT LOW STRENGTH 81 MG EC tablet take 1 tablet by mouth once daily      prasugrel (EFFIENT) 10 MG TABS Prasugrel (Effient) 10 mg Tablet Active 10 MG PO Daily 30 April 15th, 2021 3:38pm      metoprolol succinate (TOPROL XL) 25 MG extended release tablet take 1 tablet by mouth once daily      atorvastatin (LIPITOR) 10 MG tablet Atorvastatin (Lipitor) 10 mg Tablet Active 10 MG PO Daily in the evening April 14th, 2021 9:52am      metFORMIN (GLUCOPHAGE) 500 MG tablet Take 1 tablet by mouth 2 times daily (with meals)      lisinopril (PRINIVIL;ZESTRIL) 20 MG tablet Take 1 tablet by mouth daily      amLODIPine (NORVASC) 10 MG tablet Take 1 tablet by mouth daily.  30 tablet 3    glimepiride (AMARYL) 4 MG tablet Take 1 tablet by mouth every morning (before breakfast)      Ascorbic Acid (VITAMIN C) 500 MG tablet Take 1 tablet

## 2023-09-11 NOTE — PLAN OF CARE
Problem: Wound:  Goal: Will show signs of wound healing; wound closure and no evidence of infection  Description: Will show signs of wound healing; wound closure and no evidence of infection  Outcome: Progressing   Patient seen in clinic today for left leg. See AVS. Follow up in 1 week/s. Care plan reviewed with patient. Patient verbalize understanding of the plan of care and contribute to goal setting.

## 2023-09-21 ENCOUNTER — HOSPITAL ENCOUNTER (OUTPATIENT)
Dept: WOUND CARE | Age: 63
Discharge: HOME | End: 2023-09-21
Payer: MEDICARE

## 2023-09-21 VITALS
DIASTOLIC BLOOD PRESSURE: 81 MMHG | OXYGEN SATURATION: 93 % | RESPIRATION RATE: 18 BRPM | HEART RATE: 79 BPM | TEMPERATURE: 97.2 F | SYSTOLIC BLOOD PRESSURE: 136 MMHG

## 2023-09-21 DIAGNOSIS — I87.319 CHRONIC VENOUS HYPERTENSION W ULCERATION (HCC): ICD-10-CM

## 2023-09-21 DIAGNOSIS — L97.909 CHRONIC VENOUS HYPERTENSION W ULCERATION (HCC): ICD-10-CM

## 2023-09-21 DIAGNOSIS — R60.0 VENOUS STASIS ULCER OF LEFT LOWER LEG WITH EDEMA OF LEFT LOWER LEG (HCC): Primary | ICD-10-CM

## 2023-09-21 DIAGNOSIS — I89.0 LYMPHEDEMA: ICD-10-CM

## 2023-09-21 DIAGNOSIS — I83.892 VENOUS STASIS ULCER OF LEFT LOWER LEG WITH EDEMA OF LEFT LOWER LEG (HCC): Primary | ICD-10-CM

## 2023-09-21 DIAGNOSIS — L03.116 CELLULITIS OF LEFT LOWER EXTREMITY: ICD-10-CM

## 2023-09-21 DIAGNOSIS — L97.929 VENOUS STASIS ULCER OF LEFT LOWER LEG WITH EDEMA OF LEFT LOWER LEG (HCC): Primary | ICD-10-CM

## 2023-09-21 DIAGNOSIS — I83.029 VENOUS STASIS ULCER OF LEFT LOWER LEG WITH EDEMA OF LEFT LOWER LEG (HCC): Primary | ICD-10-CM

## 2023-09-21 PROCEDURE — 99212 OFFICE O/P EST SF 10 MIN: CPT

## 2023-09-21 NOTE — PLAN OF CARE
Problem: Wound:  Goal: Will show signs of wound healing; wound closure and no evidence of infection  Description: Will show signs of wound healing; wound closure and no evidence of infection  Outcome: Adequate for Discharge  Note: Patient seen for left leg wound. Wound shows signs of proper closure and healing. Wound is healed. No s/s of infection noted. Patient notified his referral is good for 3 months from this visit. If anything comes open or gets worse to please call. See AVS for order changes. Care plan reviewed with patient. Patient verbalize understanding of the plan of care and contribute to goal setting.

## 2024-04-13 ENCOUNTER — HOSPITAL ENCOUNTER (EMERGENCY)
Age: 64
Discharge: HOME | End: 2024-04-13
Payer: MEDICARE

## 2024-04-13 VITALS
SYSTOLIC BLOOD PRESSURE: 189 MMHG | RESPIRATION RATE: 16 BRPM | HEART RATE: 77 BPM | OXYGEN SATURATION: 98 % | DIASTOLIC BLOOD PRESSURE: 65 MMHG | TEMPERATURE: 97.1 F

## 2024-04-13 DIAGNOSIS — L03.116 CELLULITIS OF LEFT LOWER EXTREMITY: Primary | ICD-10-CM

## 2024-04-13 PROCEDURE — 87070 CULTURE OTHR SPECIMN AEROBIC: CPT

## 2024-04-13 PROCEDURE — 99213 OFFICE O/P EST LOW 20 MIN: CPT

## 2024-04-13 PROCEDURE — 99214 OFFICE O/P EST MOD 30 MIN: CPT

## 2024-04-13 PROCEDURE — 87205 SMEAR GRAM STAIN: CPT

## 2024-04-13 PROCEDURE — 87147 CULTURE TYPE IMMUNOLOGIC: CPT

## 2024-04-13 PROCEDURE — 87077 CULTURE AEROBIC IDENTIFY: CPT

## 2024-04-13 RX ORDER — DOXYCYCLINE HYCLATE 100 MG
100 TABLET ORAL 2 TIMES DAILY
Qty: 14 TABLET | Refills: 0 | Status: SHIPPED | OUTPATIENT
Start: 2024-04-13 | End: 2024-04-16

## 2024-04-13 RX ORDER — GLIMEPIRIDE 4 MG/1
4 TABLET ORAL
Qty: 30 TABLET | Refills: 0 | Status: SHIPPED | OUTPATIENT
Start: 2024-04-13

## 2024-04-13 ASSESSMENT — ENCOUNTER SYMPTOMS
VOMITING: 0
DIARRHEA: 0
SHORTNESS OF BREATH: 0
COUGH: 0
ABDOMINAL PAIN: 0
WHEEZING: 0

## 2024-04-13 NOTE — ED PROVIDER NOTES
[facility] URGENT CARE  Urgent Care Encounter      CHIEF COMPLAINT       Chief Complaint   Patient presents with    Medication Refill    Leg Swelling     Sore on back of leg       Nurses Notes reviewed and I agree except as noted in the HPI.  HISTORY OF PRESENT ILLNESS   Jake Pham is a 64 y.o. male who presents to urgent care with complaints of lower extremity swelling and requesting a medication refill. Patient reports he has a history of lymphedema and diabetes. Patient reports it is normal for his legs to swell although reports he does have an area of soreness/wound to his left lower extremity calf that has been weeping. Patient reports he has noticed some yellow drainage coming from wound. Patient denies fevers, chest pain, shortness of breath. Also requesting a medication refill of Glimepiride. Patient reports his primary care provider recently left the office and cannot get in to a primary provider until the middle of May. Patient reports he recently ran out of Glimepiride 2 days ago. Reports he does monitor his blood sugar at home and reports it has been in the 130's the last 3 days.      REVIEW OF SYSTEMS     Review of Systems   Constitutional:  Negative for fatigue and fever.   Respiratory:  Negative for cough, shortness of breath and wheezing.    Cardiovascular:  Positive for leg swelling. Negative for chest pain.   Gastrointestinal:  Negative for abdominal pain, diarrhea and vomiting.   Skin:  Positive for wound.   Neurological:  Negative for dizziness, seizures, numbness and headaches.       PAST MEDICAL HISTORY         Diagnosis Date    Cellulitis     Depressed     had in high school    Diabetes mellitus (HCC)     Hyperlipidemia     Hypertension        SURGICAL HISTORY     Patient  has a past surgical history that includes Foot surgery; Vein Surgery (Left, 2014); and Eye surgery.    CURRENT MEDICATIONS       Discharge Medication List as of 4/13/2024  5:38 PM        CONTINUE these  medications which have NOT CHANGED    Details   SM ASPIRIN ADULT LOW STRENGTH 81 MG EC tablet take 1 tablet by mouth once daily, DAWHistorical Med      prasugrel (EFFIENT) 10 MG TABS Prasugrel (Effient) 10 mg Tablet Active 10 MG PO Daily 30 April 15th, 2021 3:38pmHistorical Med      metoprolol succinate (TOPROL XL) 25 MG extended release tablet take 1 tablet by mouth once dailyHistorical Med      atorvastatin (LIPITOR) 10 MG tablet Atorvastatin (Lipitor) 10 mg Tablet Active 10 MG PO Daily in the evening April 14th, 2021 9:52amHistorical Med      metFORMIN (GLUCOPHAGE) 500 MG tablet Take 1 tablet by mouth 2 times daily (with meals)Historical Med      lisinopril (PRINIVIL;ZESTRIL) 20 MG tablet Take 1 tablet by mouth dailyHistorical Med      amLODIPine (NORVASC) 10 MG tablet Take 1 tablet by mouth daily., Disp-30 tablet, R-3      Ascorbic Acid (VITAMIN C) 500 MG tablet Take 1 tablet by mouth dailyHistorical Med             ALLERGIES     Patient is has No Known Allergies.    FAMILY HISTORY     Patient'sfamily history includes Arthritis in his brother, father, and mother; Cancer in his maternal grandfather and maternal grandmother; Diabetes in his mother; Early Death in his father; Heart Disease in his father; High Blood Pressure in his brother, father, and mother; High Cholesterol in his father; Stroke in his father; Vision Loss in his mother.    SOCIAL HISTORY     Patient  reports that he has never smoked. He has never been exposed to tobacco smoke. He has never used smokeless tobacco. He reports current alcohol use. He reports that he does not use drugs.    PHYSICAL EXAM     ED TRIAGE VITALS  BP: (!) 189/65, Temp: 97.1 °F (36.2 °C), Pulse: 77, Respirations: 16, SpO2: 98 %  Physical Exam  Vitals and nursing note reviewed.   Constitutional:       Appearance: Normal appearance.   HENT:      Head: Normocephalic.      Nose: Nose normal.      Mouth/Throat:      Mouth: Mucous membranes are moist.      Pharynx: Oropharynx is

## 2024-04-13 NOTE — ED NOTES
To [facility] with complaints of left leg swelling/lymphedema. States that that is normal. But sometimes he gets sores on leg and he currently has one on back of left lower leg that he is concerned for infection. Some yellow drainage noted. Also requests a refill on glimeperide medication and his PCP left town and he doesn't have an appt until May 9 at [address] [place] [address]     Maday De La Fuente RN  04/13/24 [id number]

## 2024-04-14 LAB
BACTERIA SPEC AEROBE CULT: ABNORMAL
GRAM STN SPEC: ABNORMAL
ORGANISM: ABNORMAL

## 2024-04-16 ENCOUNTER — TELEPHONE (OUTPATIENT)
Dept: FAMILY MEDICINE CLINIC | Age: 64
End: 2024-04-16

## 2024-04-16 ENCOUNTER — OFFICE VISIT (OUTPATIENT)
Dept: FAMILY MEDICINE CLINIC | Age: 64
End: 2024-04-16
Payer: MEDICARE

## 2024-04-16 VITALS
HEIGHT: 70 IN | TEMPERATURE: 98 F | HEART RATE: 67 BPM | OXYGEN SATURATION: 97 % | WEIGHT: 287 LBS | RESPIRATION RATE: 20 BRPM | SYSTOLIC BLOOD PRESSURE: 118 MMHG | DIASTOLIC BLOOD PRESSURE: 78 MMHG | BODY MASS INDEX: 41.09 KG/M2

## 2024-04-16 DIAGNOSIS — L03.116 CELLULITIS OF LEFT LOWER EXTREMITY: Primary | ICD-10-CM

## 2024-04-16 DIAGNOSIS — I89.0 LYMPHEDEMA: ICD-10-CM

## 2024-04-16 LAB
BACTERIA SPEC AEROBE CULT: ABNORMAL
BACTERIA SPEC AEROBE CULT: ABNORMAL
GRAM STN SPEC: ABNORMAL
ORGANISM: ABNORMAL

## 2024-04-16 PROCEDURE — 3017F COLORECTAL CA SCREEN DOC REV: CPT | Performed by: STUDENT IN AN ORGANIZED HEALTH CARE EDUCATION/TRAINING PROGRAM

## 2024-04-16 PROCEDURE — 1036F TOBACCO NON-USER: CPT | Performed by: STUDENT IN AN ORGANIZED HEALTH CARE EDUCATION/TRAINING PROGRAM

## 2024-04-16 PROCEDURE — 3078F DIAST BP <80 MM HG: CPT | Performed by: STUDENT IN AN ORGANIZED HEALTH CARE EDUCATION/TRAINING PROGRAM

## 2024-04-16 PROCEDURE — G8417 CALC BMI ABV UP PARAM F/U: HCPCS | Performed by: STUDENT IN AN ORGANIZED HEALTH CARE EDUCATION/TRAINING PROGRAM

## 2024-04-16 PROCEDURE — 3074F SYST BP LT 130 MM HG: CPT | Performed by: STUDENT IN AN ORGANIZED HEALTH CARE EDUCATION/TRAINING PROGRAM

## 2024-04-16 PROCEDURE — G8427 DOCREV CUR MEDS BY ELIG CLIN: HCPCS | Performed by: STUDENT IN AN ORGANIZED HEALTH CARE EDUCATION/TRAINING PROGRAM

## 2024-04-16 PROCEDURE — 99204 OFFICE O/P NEW MOD 45 MIN: CPT | Performed by: STUDENT IN AN ORGANIZED HEALTH CARE EDUCATION/TRAINING PROGRAM

## 2024-04-16 RX ORDER — CEPHALEXIN 500 MG/1
500 CAPSULE ORAL 3 TIMES DAILY
Qty: 21 CAPSULE | Refills: 0 | Status: SHIPPED | OUTPATIENT
Start: 2024-04-16 | End: 2024-04-16

## 2024-04-16 RX ORDER — CEPHALEXIN 500 MG/1
500 CAPSULE ORAL 4 TIMES DAILY
Qty: 21 CAPSULE | Refills: 0 | Status: SHIPPED | OUTPATIENT
Start: 2024-04-16 | End: 2024-04-23

## 2024-04-16 SDOH — ECONOMIC STABILITY: FOOD INSECURITY: WITHIN THE PAST 12 MONTHS, YOU WORRIED THAT YOUR FOOD WOULD RUN OUT BEFORE YOU GOT MONEY TO BUY MORE.: NEVER TRUE

## 2024-04-16 SDOH — ECONOMIC STABILITY: INCOME INSECURITY: HOW HARD IS IT FOR YOU TO PAY FOR THE VERY BASICS LIKE FOOD, HOUSING, MEDICAL CARE, AND HEATING?: NOT VERY HARD

## 2024-04-16 SDOH — ECONOMIC STABILITY: FOOD INSECURITY: WITHIN THE PAST 12 MONTHS, THE FOOD YOU BOUGHT JUST DIDN'T LAST AND YOU DIDN'T HAVE MONEY TO GET MORE.: NEVER TRUE

## 2024-04-16 SDOH — ECONOMIC STABILITY: HOUSING INSECURITY
IN THE LAST 12 MONTHS, WAS THERE A TIME WHEN YOU DID NOT HAVE A STEADY PLACE TO SLEEP OR SLEPT IN A SHELTER (INCLUDING NOW)?: NO

## 2024-04-16 ASSESSMENT — PATIENT HEALTH QUESTIONNAIRE - PHQ9
SUM OF ALL RESPONSES TO PHQ QUESTIONS 1-9: 0
2. FEELING DOWN, DEPRESSED OR HOPELESS: NOT AT ALL
SUM OF ALL RESPONSES TO PHQ QUESTIONS 1-9: 0
SUM OF ALL RESPONSES TO PHQ QUESTIONS 1-9: 0
1. LITTLE INTEREST OR PLEASURE IN DOING THINGS: NOT AT ALL
SUM OF ALL RESPONSES TO PHQ QUESTIONS 1-9: 0
SUM OF ALL RESPONSES TO PHQ9 QUESTIONS 1 & 2: 0

## 2024-04-16 NOTE — TELEPHONE ENCOUNTER
Attempted to call pt per Dr. Barber. Pt needs informed that Keflex will need to be taken 4 times daily (breakfast, lunch, dinner, and bedtime)

## 2024-04-16 NOTE — PROGRESS NOTES
Jake Pham (:  1960) is a 64 y.o. male,Established patient, here for evaluation of the following chief complaint(s):  Follow-up (Cellulitis of left lower extremity. Back of ankle. Has used rita boot in the past with great results.  this morning.)         ASSESSMENT/PLAN:  {There are no diagnoses linked to this encounter. (Refresh or delete this SmartLink)}    No follow-ups on file.         Subjective   SUBJECTIVE/OBJECTIVE:  ED f/u for cellulitis.    Went to urgent care on  and was prescibed doxycycline for LLE cellulitis. Wound has been present for at least two weeks. Feels like the wound is getting better. Reports some drainage coming from the wound and some scabbing. The back of the leg is still a bit sore. No fevers, chills, body aches.     Does not have a regular PCP. Wants to go to [address] [place] [address] clinic for primary care.         Review of Systems       Objective   Physical Exam       {Time Documentation Optional:[id number]}      An electronic signature was used to authenticate this note.    --Lennox Bell   
  [facility]  [address]  [facility] [address]  Dept: [phone]  Loc: [phone]      Jake Pham (:  1960) is a 64 y.o. male,New patient, here for evaluation of the following chief complaint(s):  Follow-up (Cellulitis of left lower extremity. Back of ankle. Has used rita boot in the past with great results.  this morning.)      ASSESSMENT/PLAN:  1. Cellulitis of left lower extremity  -     cephALEXin (KEFLEX) 500 MG capsule; Take 1 capsule by mouth 4 times daily for 7 days, Disp-21 capsule, R-0Normal    Culture positive for group B strep, discontinue doxycycline and start Keflex 4 times daily x 7 days.  Recommend close follow-up in 1 week for wound check.  Patient does not want to establish care here and would like to go to [facility].  He has an appointment in mid May.    Return in about 1 week (around 2024) for wound check.    SUBJECTIVE/OBJECTIVE:  HPI  Patient presents to the clinic for ED follow-up.    Patient has a past medical history significant for diabetes and lymphedema.  Was a patient of Dr. Parmar who has moved practices outside the area.   Was seen in the emergency department 2024 and diagnosed with cellulitis of the left lower extremity, a week ago patient noted Posterior aspect of his left lower extremity wound and warmth to the area. Diagnosed with cellulitis and given doxycycline 3 days ago and has noted some improvement.  Patient has noted some drainage but no pus.  It is  to palpation and reports underlying chronic lymphedema of bilateral lower extremities    Patient denies fever, chills, shortness of breath, chest pain.    Review of Systems  Constitutional: Negative for chills, diaphoresis and fever.   HENT: Negative for congestion and sore throat.    Eyes: Negative for redness and visual disturbance.   Respiratory: Negative for cough, chest tightness and 
After pharmacist chart review, the following recommendations are made:      This patient would benefit from a new A1C as the last one was performed in 05/2022. Based on this result, the patient's metformin could be increased to 1000 mg BID and another agent could be added such as an SGLT2i if necessary.    This patient also has an 10-year ASCVD risk of 25.2%. He would benefit from high intensity statin dosing. Could consider increasing atorvastatin to 40 mg daily.      For Pharmacy Admin Tracking Only    Program: Medical Group  CPA in place:  No  Recommendation Provided To: Provider: 2 via Note to Provider  Intervention Detail: New Rx: 2, reason: Needs Additional Therapy  Gap Closed?: No   Time Spent (min): 20           
      Attending Physician Statement  I have discussed the case, including pertinent history and exam findings with the resident. I also have seen the patient and performed key portions of the examination.  I agree with the documented assessment and plan as documented by the resident.        Freda Baca MD 4/16/2024 12:10 PM

## 2024-04-24 ENCOUNTER — OFFICE VISIT (OUTPATIENT)
Dept: FAMILY MEDICINE CLINIC | Age: 64
End: 2024-04-24
Payer: MEDICARE

## 2024-04-24 VITALS
SYSTOLIC BLOOD PRESSURE: 130 MMHG | HEIGHT: 70 IN | BODY MASS INDEX: 41.46 KG/M2 | WEIGHT: 289.6 LBS | DIASTOLIC BLOOD PRESSURE: 82 MMHG | TEMPERATURE: 98.8 F | OXYGEN SATURATION: 97 % | HEART RATE: 101 BPM | RESPIRATION RATE: 16 BRPM

## 2024-04-24 DIAGNOSIS — S81.802A WOUND OF LEFT LEG, INITIAL ENCOUNTER: Primary | ICD-10-CM

## 2024-04-24 PROCEDURE — 3017F COLORECTAL CA SCREEN DOC REV: CPT | Performed by: STUDENT IN AN ORGANIZED HEALTH CARE EDUCATION/TRAINING PROGRAM

## 2024-04-24 PROCEDURE — G8417 CALC BMI ABV UP PARAM F/U: HCPCS | Performed by: STUDENT IN AN ORGANIZED HEALTH CARE EDUCATION/TRAINING PROGRAM

## 2024-04-24 PROCEDURE — 99213 OFFICE O/P EST LOW 20 MIN: CPT | Performed by: STUDENT IN AN ORGANIZED HEALTH CARE EDUCATION/TRAINING PROGRAM

## 2024-04-24 PROCEDURE — 1036F TOBACCO NON-USER: CPT | Performed by: STUDENT IN AN ORGANIZED HEALTH CARE EDUCATION/TRAINING PROGRAM

## 2024-04-24 PROCEDURE — 3079F DIAST BP 80-89 MM HG: CPT | Performed by: STUDENT IN AN ORGANIZED HEALTH CARE EDUCATION/TRAINING PROGRAM

## 2024-04-24 PROCEDURE — 3075F SYST BP GE 130 - 139MM HG: CPT | Performed by: STUDENT IN AN ORGANIZED HEALTH CARE EDUCATION/TRAINING PROGRAM

## 2024-04-24 PROCEDURE — G8427 DOCREV CUR MEDS BY ELIG CLIN: HCPCS | Performed by: STUDENT IN AN ORGANIZED HEALTH CARE EDUCATION/TRAINING PROGRAM

## 2024-04-24 RX ORDER — CEPHALEXIN 500 MG/1
CAPSULE ORAL
COMMUNITY
Start: 2023-08-28 | End: 2024-05-03

## 2024-05-02 ASSESSMENT — ENCOUNTER SYMPTOMS
RHINORRHEA: 0
EYE DISCHARGE: 0
COUGH: 0
ABDOMINAL PAIN: 0
SORE THROAT: 0
SHORTNESS OF BREATH: 0

## 2024-05-03 ENCOUNTER — HOSPITAL ENCOUNTER (OUTPATIENT)
Dept: WOUND CARE | Age: 64
Discharge: HOME | End: 2024-05-03
Attending: NURSE PRACTITIONER
Payer: MEDICARE

## 2024-05-03 ENCOUNTER — TELEPHONE (OUTPATIENT)
Dept: WOUND CARE | Age: 64
End: 2024-05-03

## 2024-05-03 VITALS
SYSTOLIC BLOOD PRESSURE: 146 MMHG | WEIGHT: 289 LBS | HEART RATE: 97 BPM | RESPIRATION RATE: 16 BRPM | HEIGHT: 70 IN | DIASTOLIC BLOOD PRESSURE: 71 MMHG | OXYGEN SATURATION: 93 % | BODY MASS INDEX: 41.37 KG/M2 | TEMPERATURE: 97.7 F

## 2024-05-03 DIAGNOSIS — I83.892 VENOUS STASIS ULCER OF LEFT LOWER LEG WITH EDEMA OF LEFT LOWER LEG (HCC): Primary | ICD-10-CM

## 2024-05-03 DIAGNOSIS — I89.0 LYMPHEDEMA: ICD-10-CM

## 2024-05-03 DIAGNOSIS — R60.0 VENOUS STASIS ULCER OF LEFT LOWER LEG WITH EDEMA OF LEFT LOWER LEG (HCC): Primary | ICD-10-CM

## 2024-05-03 DIAGNOSIS — L97.929 VENOUS STASIS ULCER OF LEFT LOWER LEG WITH EDEMA OF LEFT LOWER LEG (HCC): Primary | ICD-10-CM

## 2024-05-03 DIAGNOSIS — I83.029 VENOUS STASIS ULCER OF LEFT LOWER LEG WITH EDEMA OF LEFT LOWER LEG (HCC): Primary | ICD-10-CM

## 2024-05-03 PROBLEM — L84 CALLUS OF FOOT: Status: RESOLVED | Noted: 2022-04-26 | Resolved: 2024-05-03

## 2024-05-03 PROCEDURE — 29580 STRAPPING UNNA BOOT: CPT

## 2024-05-03 PROCEDURE — 99214 OFFICE O/P EST MOD 30 MIN: CPT | Performed by: NURSE PRACTITIONER

## 2024-05-03 PROCEDURE — 99212 OFFICE O/P EST SF 10 MIN: CPT

## 2024-05-03 RX ORDER — GINSENG 100 MG
CAPSULE ORAL ONCE
OUTPATIENT
Start: 2024-05-03 | End: 2024-05-03

## 2024-05-03 RX ORDER — CLOBETASOL PROPIONATE 0.5 MG/G
OINTMENT TOPICAL ONCE
OUTPATIENT
Start: 2024-05-03 | End: 2024-05-03

## 2024-05-03 RX ORDER — LIDOCAINE 40 MG/G
CREAM TOPICAL ONCE
OUTPATIENT
Start: 2024-05-03 | End: 2024-05-03

## 2024-05-03 RX ORDER — LIDOCAINE HYDROCHLORIDE 20 MG/ML
JELLY TOPICAL ONCE
OUTPATIENT
Start: 2024-05-03 | End: 2024-05-03

## 2024-05-03 RX ORDER — BETAMETHASONE DIPROPIONATE 0.5 MG/G
CREAM TOPICAL ONCE
OUTPATIENT
Start: 2024-05-03 | End: 2024-05-03

## 2024-05-03 RX ORDER — LIDOCAINE 50 MG/G
OINTMENT TOPICAL ONCE
OUTPATIENT
Start: 2024-05-03 | End: 2024-05-03

## 2024-05-03 RX ORDER — BACITRACIN ZINC AND POLYMYXIN B SULFATE 500; 1000 [USP'U]/G; [USP'U]/G
OINTMENT TOPICAL ONCE
OUTPATIENT
Start: 2024-05-03 | End: 2024-05-03

## 2024-05-03 RX ORDER — LIDOCAINE HYDROCHLORIDE 40 MG/ML
SOLUTION TOPICAL ONCE
OUTPATIENT
Start: 2024-05-03 | End: 2024-05-03

## 2024-05-03 RX ORDER — SODIUM CHLOR/HYPOCHLOROUS ACID 0.033 %
SOLUTION, IRRIGATION IRRIGATION ONCE
OUTPATIENT
Start: 2024-05-03 | End: 2024-05-03

## 2024-05-03 RX ORDER — GENTAMICIN SULFATE 1 MG/G
OINTMENT TOPICAL ONCE
OUTPATIENT
Start: 2024-05-03 | End: 2024-05-03

## 2024-05-03 RX ORDER — TRIAMCINOLONE ACETONIDE 1 MG/G
OINTMENT TOPICAL ONCE
OUTPATIENT
Start: 2024-05-03 | End: 2024-05-03

## 2024-05-03 RX ORDER — IBUPROFEN 200 MG
TABLET ORAL ONCE
OUTPATIENT
Start: 2024-05-03 | End: 2024-05-03

## 2024-05-03 NOTE — PROGRESS NOTES
Unna Boot Application   Below Knee    NAME:  Jake Pham  YOB: 1960  MEDICAL RECORD NUMBER:  [id number]  DATE:  5/3/2024    Unna boot: Applied moisturizing agent to dry skin as needed.   Appied primary and secondary dressing as ordered.  Applied Unna roll from toes to knee overlapping each time.   Applied ace wrap or coban from toes to below the knee.   Secured with tape and/or metal clips covered with tape.   Instructed patient/caregiver to keep dressing dry and intact. DO NOT REMOVE DRESSING.   Instructed pt/family/caregiver to report excessive draining, loose bandage, wet dressing, severe pain or tingling in toes.  Applied Unna Boot dressing below the knee to left lower leg.    Unna Boot(s) were applied per  Guidelines.     Electronically signed by Kerry Mckay RN on 5/3/2024 at 3:29 PM     
[facility]  [address]  [address]   [place], [place] [address]  Telephone: [phone]     FAX [phone]    Home health agencies: [facility] Phone # [phone], Fax # [phone]      Patient Instructions   Visit Discharge/Physician Orders:  - Elevate legs periodically throughout the day to decrease swelling.  - Need to use your lymphedema pumps as ordered for 1 hour twice daily.    - Continue compression stocking on right leg, applying daily in the morning and remove at bedtime.    Home Care: Referral to [facility]    Wound Location: Left posterior lower leg    Dressing orders per Home Health:     1) Gather wound care supplies and arrange on clean table.     2) Wash your hands with soap and water or use alcohol based hand  for 20 seconds (sing \"Happy Birthday\" twice).    3) Cleanse wounds with normal saline or wound cleanser and gauze. Pat dry with clean gauze.    4) Left leg- Apply alginate to wound. Wrap with unna boot, ABD over wound area, then roll gauze, then coban from base of toes to about 1-2 inches below bend of the knee. Home Health to change twice weekly.  -Apply moisturizing cream to leg/foot with dressing changes, avoid wound area.    *If unna boot becomes too tight- raise/elevate legs above the level of the heart for 15-20 minutes if swelling does not go down then carefully cut off unna boot and call clinic or Home Health or go to local ER or family physician. If unna boot becomes wet or starts to roll down then carefully remove unna boot and call clinic or Home Health.      Keep all dressings clean & dry.    Follow up visit: 3 Weeks - Wednesday May 22nd at 2:30 pm    Supplies: Per Home Health    Keep next scheduled appointment. Please give 24 hour notice if unable to keep appointment. [phone]    If you experience any of the following, please call the Wound Care Service during business hours: Monday through Friday 8:00 am - 
Mother     Vision Loss Mother     Arthritis Father     Heart Disease Father     Early Death Father     Stroke Father     High Blood Pressure Father     High Cholesterol Father     Arthritis Brother     High Blood Pressure Brother     Cancer Maternal Grandmother     Cancer Maternal Grandfather        SOCIAL HISTORY     Social History     Tobacco Use    Smoking status: Never     Passive exposure: Never    Smokeless tobacco: Never   Vaping Use    Vaping Use: Never used   Substance Use Topics    Alcohol use: Yes     Comment: social     Drug use: No       ALLERGIES     No Known Allergies    MEDICATIONS     Current Outpatient Medications on File Prior to Encounter   Medication Sig Dispense Refill    glimepiride (AMARYL) 4 MG tablet Take 1 tablet by mouth every morning (before breakfast) 30 tablet 0    SM ASPIRIN ADULT LOW STRENGTH 81 MG EC tablet take 1 tablet by mouth once daily      prasugrel (EFFIENT) 10 MG TABS Prasugrel (Effient) 10 mg Tablet Active 10 MG PO Daily 30 April 15th, 2021 3:38pm      metoprolol succinate (TOPROL XL) 25 MG extended release tablet take 1 tablet by mouth once daily      atorvastatin (LIPITOR) 10 MG tablet Atorvastatin (Lipitor) 10 mg Tablet Active 10 MG PO Daily in the evening April 14th, 2021 9:52am      metFORMIN (GLUCOPHAGE) 500 MG tablet Take 1 tablet by mouth 2 times daily (with meals)      lisinopril (PRINIVIL;ZESTRIL) 20 MG tablet Take 1 tablet by mouth daily      amLODIPine (NORVASC) 10 MG tablet Take 1 tablet by mouth daily. 30 tablet 3    Ascorbic Acid (VITAMIN C) 500 MG tablet Take 1 tablet by mouth daily       No current facility-administered medications on file prior to encounter.       REVIEW OF SYSTEMS:     A comprehensive review of systems was negative except for: Pertinent items are noted in HPI.    PHYSICAL EXAM:     BP (!) 146/71   Pulse 97   Temp 97.7 °F (36.5 °C) (Infrared)   Resp 16   Ht 1.778 m (5' 10\")   Wt 131.1 kg (289 lb)   SpO2 93%   BMI 41.47 kg/m²   Wt

## 2024-05-03 NOTE — PROGRESS NOTES
I reviewed with the resident the medical history and the resident's findings on the physical examination.  I discussed with the resident the patient's diagnosis and concur with the plan. GE Modifier added.  
(before breakfast) 4/13/24  Yes Charlotte Olson, APRN - CNP   SM ASPIRIN ADULT LOW STRENGTH 81 MG EC tablet take 1 tablet by mouth once daily 10/12/21  Yes ProviderEunice MD   prasugrel (EFFIENT) 10 MG TABS Prasugrel (Effient) 10 mg Tablet Active 10 MG PO Daily 30 April 15th, 2021 3:38pm 4/15/21  Yes Eunice Vail MD   metoprolol succinate (TOPROL XL) 25 MG extended release tablet take 1 tablet by mouth once daily 8/23/21  Yes ProviderEunice MD   atorvastatin (LIPITOR) 10 MG tablet Atorvastatin (Lipitor) 10 mg Tablet Active 10 MG PO Daily in the evening April 14th, 2021 9:52am 4/14/21  Yes Eunice Vail MD   metFORMIN (GLUCOPHAGE) 500 MG tablet Take 1 tablet by mouth 2 times daily (with meals)   Yes ProviderEunice MD   lisinopril (PRINIVIL;ZESTRIL) 20 MG tablet Take 1 tablet by mouth daily   Yes ProviderEunice MD   amLODIPine (NORVASC) 10 MG tablet Take 1 tablet by mouth daily. 6/8/13  Yes Nir Michelle MD   Ascorbic Acid (VITAMIN C) 500 MG tablet Take 1 tablet by mouth daily   Yes Provider, MD Eunice        Allergies    Patient has no known allergies.    Social    Social History     Tobacco Use    Smoking status: Never     Passive exposure: Never    Smokeless tobacco: Never   Vaping Use    Vaping Use: Never used   Substance Use Topics    Alcohol use: Yes     Comment: social     Drug use: No       Physical Exam  Vitals:    04/24/24 1458   BP: 130/82   Site: Left Upper Arm   Position: Sitting   Cuff Size: Medium Adult   Pulse: (!) 101   Resp: 16   Temp: 98.8 °F (37.1 °C)   TempSrc: Oral   SpO2: 97%   Weight: 131.4 kg (289 lb 9.6 oz)   Height: 1.778 m (5' 10\")         Physical Exam:  GENERAL: No acute distress. Alert and conversant.  EYES: Normal conjunctiva. Anicteric. Round symmetric pupils.  ENT: No nasal discharge. Hearing grossly intact.   NECK: Supple. No masses or thyromegaly.  HEART: Normal S1/S2. No murmurs. No edema.  LUNGS: Respirations non-labored. Clear

## 2024-05-03 NOTE — PATIENT INSTRUCTIONS
Visit Discharge/Physician Orders:  - Elevate legs periodically throughout the day to decrease swelling.  - Need to use your lymphedema pumps as ordered for 1 hour twice daily.    - Continue compression stocking on right leg, applying daily in the morning and remove at bedtime.    Home Care: Referral to [facility]    Wound Location: Left posterior lower leg    Dressing orders per Home Health:     1) Gather wound care supplies and arrange on clean table.     2) Wash your hands with soap and water or use alcohol based hand  for 20 seconds (sing \"Happy Birthday\" twice).    3) Cleanse wounds with normal saline or wound cleanser and gauze. Pat dry with clean gauze.    4) Left leg- Apply alginate to wound. Wrap with unna boot, ABD over wound area, then roll gauze, then coban from base of toes to about 1-2 inches below bend of the knee. Home Health to change twice weekly.  -Apply moisturizing cream to leg/foot with dressing changes, avoid wound area.    *If unna boot becomes too tight- raise/elevate legs above the level of the heart for 15-20 minutes if swelling does not go down then carefully cut off unna boot and call clinic or Home Health or go to local ER or family physician. If unna boot becomes wet or starts to roll down then carefully remove unna boot and call clinic or Home Health.      Keep all dressings clean & dry.    Follow up visit: 3 Weeks - Wednesday May 22nd at 2:30 pm    Supplies: Per Home Health    Keep next scheduled appointment. Please give 24 hour notice if unable to keep appointment. [phone]    If you experience any of the following, please call the Wound Care Service during business hours: Monday through Friday 8:00 am - 4:30 pm  ([phone]).   *Increase in pain   *Temperature over 101   *Increase in drainage from your wound or a foul odor   *Uncontrolled swelling   *Need for compression bandage changes due to slippage, breakthrough drainage    If you need medical

## 2024-05-03 NOTE — PLAN OF CARE
Problem: Wound:  Goal: Will show signs of wound healing; wound closure and no evidence of infection  Description: Will show signs of wound healing; wound closure and no evidence of infection  Outcome: Progressing   Patient presents to wound clinic for evaluation and treatment of left leg. Referral to [facility]. Discharge instructions/dressing orders per AVS. Follow up appointment scheduled in 3 weeks.    Care plan reviewed with patient.  Patient verbalize understanding of the plan of care and contribute to goal setting.

## 2024-05-03 NOTE — TELEPHONE ENCOUNTER
Tonya from [facility] contacted the office, states they will see patient on Tuesday.

## 2024-05-07 ENCOUNTER — TELEPHONE (OUTPATIENT)
Dept: WOUND CARE | Age: 64
End: 2024-05-07

## 2024-05-07 NOTE — TELEPHONE ENCOUNTER
Patient advised to call home health. Contact information given to patient. Home Health at last conversation was planning to see patient today.

## 2024-05-07 NOTE — TELEPHONE ENCOUNTER
----- Message from Cesia Casarez sent at 5/7/2024 10:15 AM EDT -----   [phone] ABBY STATES NO ONE HAS CONTACTED HIM ABOUT HOME HEALTH COMING OUT

## 2024-05-13 RX ORDER — GLIMEPIRIDE 4 MG/1
4 TABLET ORAL
Qty: 30 TABLET | Refills: 0 | OUTPATIENT
Start: 2024-05-13

## 2024-05-23 ENCOUNTER — TELEPHONE (OUTPATIENT)
Dept: WOUND CARE | Age: 64
End: 2024-05-23

## 2024-05-23 NOTE — TELEPHONE ENCOUNTER
Patient called and stated he missed his appointment yesterday because he thought it was today. Patient rescheduled for 6/5/24 at 230

## 2024-06-05 ENCOUNTER — HOSPITAL ENCOUNTER (OUTPATIENT)
Dept: WOUND CARE | Age: 64
Discharge: HOME | End: 2024-06-05
Attending: NURSE PRACTITIONER
Payer: MEDICARE

## 2024-06-05 VITALS
SYSTOLIC BLOOD PRESSURE: 141 MMHG | TEMPERATURE: 98 F | RESPIRATION RATE: 16 BRPM | DIASTOLIC BLOOD PRESSURE: 77 MMHG | HEART RATE: 64 BPM | OXYGEN SATURATION: 95 %

## 2024-06-05 DIAGNOSIS — L97.929 VENOUS STASIS ULCER OF LEFT LOWER LEG WITH EDEMA OF LEFT LOWER LEG (HCC): ICD-10-CM

## 2024-06-05 DIAGNOSIS — L97.909 CHRONIC VENOUS HYPERTENSION W ULCERATION (HCC): Primary | ICD-10-CM

## 2024-06-05 DIAGNOSIS — I83.029 VENOUS STASIS ULCER OF LEFT LOWER LEG WITH EDEMA OF LEFT LOWER LEG (HCC): ICD-10-CM

## 2024-06-05 DIAGNOSIS — I87.319 CHRONIC VENOUS HYPERTENSION W ULCERATION (HCC): Primary | ICD-10-CM

## 2024-06-05 DIAGNOSIS — I83.892 VENOUS STASIS ULCER OF LEFT LOWER LEG WITH EDEMA OF LEFT LOWER LEG (HCC): ICD-10-CM

## 2024-06-05 DIAGNOSIS — R60.0 VENOUS STASIS ULCER OF LEFT LOWER LEG WITH EDEMA OF LEFT LOWER LEG (HCC): ICD-10-CM

## 2024-06-05 DIAGNOSIS — I89.0 LYMPHEDEMA: ICD-10-CM

## 2024-06-05 PROCEDURE — 99212 OFFICE O/P EST SF 10 MIN: CPT

## 2024-06-05 RX ORDER — LIDOCAINE 40 MG/G
CREAM TOPICAL ONCE
Status: CANCELLED | OUTPATIENT
Start: 2024-06-05 | End: 2024-06-05

## 2024-06-05 RX ORDER — LIDOCAINE HYDROCHLORIDE 20 MG/ML
JELLY TOPICAL ONCE
Status: CANCELLED | OUTPATIENT
Start: 2024-06-05 | End: 2024-06-05

## 2024-06-05 RX ORDER — BETAMETHASONE DIPROPIONATE 0.5 MG/G
CREAM TOPICAL ONCE
Status: CANCELLED | OUTPATIENT
Start: 2024-06-05 | End: 2024-06-05

## 2024-06-05 RX ORDER — GINSENG 100 MG
CAPSULE ORAL ONCE
Status: CANCELLED | OUTPATIENT
Start: 2024-06-05 | End: 2024-06-05

## 2024-06-05 RX ORDER — LIDOCAINE 50 MG/G
OINTMENT TOPICAL ONCE
Status: CANCELLED | OUTPATIENT
Start: 2024-06-05 | End: 2024-06-05

## 2024-06-05 RX ORDER — TRIAMCINOLONE ACETONIDE 1 MG/G
OINTMENT TOPICAL ONCE
Status: CANCELLED | OUTPATIENT
Start: 2024-06-05 | End: 2024-06-05

## 2024-06-05 RX ORDER — BACITRACIN ZINC AND POLYMYXIN B SULFATE 500; 1000 [USP'U]/G; [USP'U]/G
OINTMENT TOPICAL ONCE
Status: CANCELLED | OUTPATIENT
Start: 2024-06-05 | End: 2024-06-05

## 2024-06-05 RX ORDER — LIDOCAINE HYDROCHLORIDE 40 MG/ML
SOLUTION TOPICAL ONCE
Status: CANCELLED | OUTPATIENT
Start: 2024-06-05 | End: 2024-06-05

## 2024-06-05 RX ORDER — SODIUM CHLOR/HYPOCHLOROUS ACID 0.033 %
SOLUTION, IRRIGATION IRRIGATION ONCE
Status: CANCELLED | OUTPATIENT
Start: 2024-06-05 | End: 2024-06-05

## 2024-06-05 RX ORDER — IBUPROFEN 200 MG
TABLET ORAL ONCE
Status: CANCELLED | OUTPATIENT
Start: 2024-06-05 | End: 2024-06-05

## 2024-06-05 RX ORDER — GENTAMICIN SULFATE 1 MG/G
OINTMENT TOPICAL ONCE
Status: CANCELLED | OUTPATIENT
Start: 2024-06-05 | End: 2024-06-05

## 2024-06-05 RX ORDER — CLOBETASOL PROPIONATE 0.5 MG/G
OINTMENT TOPICAL ONCE
Status: CANCELLED | OUTPATIENT
Start: 2024-06-05 | End: 2024-06-05

## 2024-06-05 NOTE — PATIENT INSTRUCTIONS
Visit Discharge/Physician Orders:  - Elevate legs periodically throughout the day to decrease swelling.  - Use your lymphedema pumps on both legs as ordered for 1 hour twice daily.    - Apply compression stockings to both lower legs, applying daily in the morning and remove at bedtime.  - Apply moisturizing cream to dry skin on legs daily.    Home Care: [facility] - discharge services    Wound Location: Left posterior lower leg - healed    Follow up visit: Discharge from Wound Clinic - follow up as needed    Keep next scheduled appointment. Please give 24 hour notice if unable to keep appointment. [phone]    If you experience any of the following, please call the Wound Care Service during business hours: Monday through Friday 8:00 am - 4:30 pm  ([phone]).   *Increase in pain   *Temperature over 101   *Increase in drainage from your wound or a foul odor   *Uncontrolled swelling   *Need for compression bandage changes due to slippage, breakthrough drainage    If you need medical attention outside of business hours, please contact your Primary Care Doctor or go to the nearest emergency room.

## 2024-06-05 NOTE — PROGRESS NOTES
[facility]  [address]  [facility] [address]   [place], [place] [address]  Telephone: [phone]     FAX [phone]    Home health agencies: [facility] Phone # [phone], Fax # [phone]      Patient Instructions   Visit Discharge/Physician Orders:  - Elevate legs periodically throughout the day to decrease swelling.  - Use your lymphedema pumps on both legs as ordered for 1 hour twice daily.    - Apply compression stockings to both lower legs, applying daily in the morning and remove at bedtime.  - Apply moisturizing cream to dry skin on legs daily.    Home Care: [facility] - discharge services    Wound Location: Left posterior lower leg - healed    Follow up visit: Discharge from Wound Clinic - follow up as needed    Keep next scheduled appointment. Please give 24 hour notice if unable to keep appointment. [phone]    If you experience any of the following, please call the Wound Care Service during business hours: Monday through Friday 8:00 am - 4:30 pm  ([phone]).   *Increase in pain   *Temperature over 101   *Increase in drainage from your wound or a foul odor   *Uncontrolled swelling   *Need for compression bandage changes due to slippage, breakthrough drainage    If you need medical attention outside of business hours, please contact your Primary Care Doctor or go to the nearest emergency room.                         Patient seen and treated on 6/5/2024    By PROVIDER'S NAME: Maira Wong CNP   NPI: [id number]

## 2024-06-05 NOTE — PLAN OF CARE
Problem: Wound:  Goal: Will show signs of wound healing; wound closure and no evidence of infection  Description: Will show signs of wound healing; wound closure and no evidence of infection  Outcome: Completed   Patient presents to wound clinic for follow up of left leg wound. Wound is healed. Discharge instructions/orders per AVS. Follow up as needed.    Care plan reviewed with patient.  Patient verbalizes understanding of the plan of care and contribute to goal setting.

## 2024-06-05 NOTE — PROGRESS NOTES
[facility]  Consult and Procedure Note      Jake Pham  MEDICAL RECORD NUMBER:  [id number]  AGE: 64 y.o.   GENDER: male  : 1960  EPISODE DATE:  2024  Referring Provider: Ayah Chery MD   Reason for Referral: left leg wound    SUBJECTIVE:     Chief Complaint   Patient presents with    Wound Check     Left leg          HISTORY OF PRESENT ILLNESS      Jake Pham is a 64 y.o. male who presents today for wound/ulcer evaluation. Patient is established. Wound duration:  2024 .    Patient presents today for re-evaluation of left lower extremity wound.  He has extensive history of venous disease and lymphedema to legs with open wounds, has followed previously with Dr. Andrews, Dr. Rainey, Dr. Umanzor, Dr. Glez [facility]. Was last evaluated by me 2023 at which time wounds were healed.  He states he has compression stockings and lymphedema pumps at home, does not use as directed. Current wounds began in April, was evaluated in urgent care 24 for this problem where he was placed on Doxycycline.  This was stopped as culture showed group B strep, was treated with Keflex alternatively.  States that drainage and redness resolved with this, but continued to have wound to posterior leg prompting referral to clinic.  At last visit calcium alginate and unna boots were ordered, changed twice weekly by home health.  He denies any concerns with wound care as ordered, states that he thinks wounds have healed.  He denies any further needs or concerns.    PAST MEDICAL HISTORY             Diagnosis Date    Cellulitis     Depressed     had in high school    Diabetes mellitus (HCC)     Hyperlipidemia     Hypertension        PAST SURGICAL HISTORY     Past Surgical History:   Procedure Laterality Date    EYE SURGERY      laser    FOOT SURGERY      VEIN SURGERY Left        FAMILY HISTORY     Family History   Problem Relation Age of Onset    Arthritis

## 2025-03-13 ENCOUNTER — TELEPHONE (OUTPATIENT)
Dept: WOUND CARE | Age: 65
End: 2025-03-13

## 2025-03-13 NOTE — TELEPHONE ENCOUNTER
Attempted to call patient regarding referral for left leg wound.  Someone named \"stephanie\" answered the phone and stated Jake is using his phone number but he is not available at the time of call.   Message left for patient to call back.

## 2025-03-20 ENCOUNTER — HOSPITAL ENCOUNTER (OUTPATIENT)
Dept: WOUND CARE | Age: 65
Discharge: HOME | End: 2025-03-20
Attending: NURSE PRACTITIONER
Payer: MEDICARE

## 2025-03-20 VITALS
SYSTOLIC BLOOD PRESSURE: 129 MMHG | WEIGHT: 285 LBS | HEIGHT: 70 IN | OXYGEN SATURATION: 93 % | BODY MASS INDEX: 40.8 KG/M2 | HEART RATE: 91 BPM | TEMPERATURE: 97.2 F | DIASTOLIC BLOOD PRESSURE: 74 MMHG | RESPIRATION RATE: 18 BRPM

## 2025-03-20 DIAGNOSIS — I83.029 VENOUS STASIS ULCER OF LEFT LOWER LEG WITH EDEMA OF LEFT LOWER LEG (HCC): Primary | ICD-10-CM

## 2025-03-20 DIAGNOSIS — I83.892 VENOUS STASIS ULCER OF LEFT LOWER LEG WITH EDEMA OF LEFT LOWER LEG (HCC): Primary | ICD-10-CM

## 2025-03-20 DIAGNOSIS — R60.0 VENOUS STASIS ULCER OF LEFT LOWER LEG WITH EDEMA OF LEFT LOWER LEG (HCC): Primary | ICD-10-CM

## 2025-03-20 DIAGNOSIS — I89.0 LYMPHEDEMA: ICD-10-CM

## 2025-03-20 DIAGNOSIS — Z91.199 NONADHERENCE TO MEDICAL TREATMENT: ICD-10-CM

## 2025-03-20 DIAGNOSIS — L97.929 VENOUS STASIS ULCER OF LEFT LOWER LEG WITH EDEMA OF LEFT LOWER LEG (HCC): Primary | ICD-10-CM

## 2025-03-20 PROCEDURE — 29580 STRAPPING UNNA BOOT: CPT

## 2025-03-20 PROCEDURE — 99214 OFFICE O/P EST MOD 30 MIN: CPT | Performed by: NURSE PRACTITIONER

## 2025-03-20 PROCEDURE — 99213 OFFICE O/P EST LOW 20 MIN: CPT

## 2025-03-20 RX ORDER — BACITRACIN ZINC AND POLYMYXIN B SULFATE 500; 1000 [USP'U]/G; [USP'U]/G
OINTMENT TOPICAL PRN
Status: CANCELLED | OUTPATIENT
Start: 2025-03-20

## 2025-03-20 RX ORDER — NEOMYCIN/BACITRACIN/POLYMYXINB 3.5-400-5K
OINTMENT (GRAM) TOPICAL PRN
OUTPATIENT
Start: 2025-03-20

## 2025-03-20 RX ORDER — LIDOCAINE HYDROCHLORIDE 20 MG/ML
JELLY TOPICAL PRN
OUTPATIENT
Start: 2025-03-20

## 2025-03-20 RX ORDER — LIDOCAINE 50 MG/G
OINTMENT TOPICAL PRN
OUTPATIENT
Start: 2025-03-20

## 2025-03-20 RX ORDER — LIDOCAINE 40 MG/G
CREAM TOPICAL PRN
Status: CANCELLED | OUTPATIENT
Start: 2025-03-20

## 2025-03-20 RX ORDER — LIDOCAINE 40 MG/G
CREAM TOPICAL PRN
OUTPATIENT
Start: 2025-03-20

## 2025-03-20 RX ORDER — LIDOCAINE HYDROCHLORIDE 20 MG/ML
JELLY TOPICAL PRN
Status: CANCELLED | OUTPATIENT
Start: 2025-03-20

## 2025-03-20 RX ORDER — MUPIROCIN 20 MG/G
OINTMENT TOPICAL PRN
Status: CANCELLED | OUTPATIENT
Start: 2025-03-20

## 2025-03-20 RX ORDER — NEOMYCIN/BACITRACIN/POLYMYXINB 3.5-400-5K
OINTMENT (GRAM) TOPICAL PRN
Status: CANCELLED | OUTPATIENT
Start: 2025-03-20

## 2025-03-20 RX ORDER — SODIUM CHLOR/HYPOCHLOROUS ACID 0.033 %
SOLUTION, IRRIGATION IRRIGATION PRN
OUTPATIENT
Start: 2025-03-20

## 2025-03-20 RX ORDER — BETAMETHASONE DIPROPIONATE 0.5 MG/G
CREAM TOPICAL PRN
OUTPATIENT
Start: 2025-03-20

## 2025-03-20 RX ORDER — BETAMETHASONE DIPROPIONATE 0.5 MG/G
CREAM TOPICAL PRN
Status: CANCELLED | OUTPATIENT
Start: 2025-03-20

## 2025-03-20 RX ORDER — GENTAMICIN SULFATE 1 MG/G
OINTMENT TOPICAL PRN
Status: CANCELLED | OUTPATIENT
Start: 2025-03-20

## 2025-03-20 RX ORDER — GINSENG 100 MG
CAPSULE ORAL PRN
OUTPATIENT
Start: 2025-03-20

## 2025-03-20 RX ORDER — LATANOPROST 50 UG/ML
1 SOLUTION/ DROPS OPHTHALMIC NIGHTLY
COMMUNITY

## 2025-03-20 RX ORDER — CLOBETASOL PROPIONATE 0.5 MG/G
OINTMENT TOPICAL PRN
Status: CANCELLED | OUTPATIENT
Start: 2025-03-20

## 2025-03-20 RX ORDER — SODIUM CHLOR/HYPOCHLOROUS ACID 0.033 %
SOLUTION, IRRIGATION IRRIGATION PRN
Status: CANCELLED | OUTPATIENT
Start: 2025-03-20

## 2025-03-20 RX ORDER — GENTAMICIN SULFATE 1 MG/G
OINTMENT TOPICAL PRN
OUTPATIENT
Start: 2025-03-20

## 2025-03-20 RX ORDER — LIDOCAINE HYDROCHLORIDE 40 MG/ML
SOLUTION TOPICAL PRN
OUTPATIENT
Start: 2025-03-20

## 2025-03-20 RX ORDER — SILVER SULFADIAZINE 10 MG/G
CREAM TOPICAL PRN
Status: CANCELLED | OUTPATIENT
Start: 2025-03-20

## 2025-03-20 RX ORDER — LIDOCAINE 50 MG/G
OINTMENT TOPICAL PRN
Status: CANCELLED | OUTPATIENT
Start: 2025-03-20

## 2025-03-20 RX ORDER — GINSENG 100 MG
CAPSULE ORAL PRN
Status: CANCELLED | OUTPATIENT
Start: 2025-03-20

## 2025-03-20 RX ORDER — TRIAMCINOLONE ACETONIDE 1 MG/G
OINTMENT TOPICAL PRN
OUTPATIENT
Start: 2025-03-20

## 2025-03-20 RX ORDER — LIDOCAINE HYDROCHLORIDE 40 MG/ML
SOLUTION TOPICAL PRN
Status: CANCELLED | OUTPATIENT
Start: 2025-03-20

## 2025-03-20 RX ORDER — SILVER SULFADIAZINE 10 MG/G
CREAM TOPICAL PRN
OUTPATIENT
Start: 2025-03-20

## 2025-03-20 RX ORDER — MUPIROCIN 20 MG/G
OINTMENT TOPICAL PRN
OUTPATIENT
Start: 2025-03-20

## 2025-03-20 RX ORDER — TRIAMCINOLONE ACETONIDE 1 MG/G
OINTMENT TOPICAL PRN
Status: CANCELLED | OUTPATIENT
Start: 2025-03-20

## 2025-03-20 RX ORDER — CLOBETASOL PROPIONATE 0.5 MG/G
OINTMENT TOPICAL PRN
OUTPATIENT
Start: 2025-03-20

## 2025-03-20 RX ORDER — BACITRACIN ZINC AND POLYMYXIN B SULFATE 500; 1000 [USP'U]/G; [USP'U]/G
OINTMENT TOPICAL PRN
OUTPATIENT
Start: 2025-03-20

## 2025-03-20 ASSESSMENT — PAIN SCALES - GENERAL: PAINLEVEL_OUTOF10: 2

## 2025-03-20 ASSESSMENT — PAIN DESCRIPTION - DESCRIPTORS: DESCRIPTORS: OTHER (COMMENT)

## 2025-03-20 ASSESSMENT — PAIN DESCRIPTION - LOCATION: LOCATION: LEG

## 2025-03-20 ASSESSMENT — PAIN DESCRIPTION - ORIENTATION: ORIENTATION: LEFT

## 2025-03-20 NOTE — PATIENT INSTRUCTIONS
Visit Discharge/Physician Orders:  - Elevate leg to help decrease swelling.   - Recommend using your lymphedema pumps twice daily for 1 hour each time.   - Juxta lite velcro compression wraps ordered through [employer] and will be delivered to your home.     Home Care: Referral to P    Wound Location: Left lower leg    Dressing orders:     1) Gather wound care supplies and arrange on clean table.     2) Wash your hands with soap and water or use alcohol based hand  for 20 seconds (sing \"Happy Birthday\" twice).    3) Cleanse wounds with normal saline or wound cleanser and gauze. Pat dry with clean gauze.    4) Left lower leg - Apply moisturizing cream to dry skin on foot/leg. Apply alginate to wound bed. Wrap with unna boot, ABD over wound area, roll gauze, then coban from base of toes to 1-2 inches below the bend of the knee. Home health to change twice weekly.     If unna boot becomes too tight- raise/elevate legs above the level of the heart for 15-20 minutes if swelling does not go down then carefully cut off unna boot and call clinic or home health. If unna boot becomes wet or starts to roll down then carefully remove unna boot and call clinic or home health.       Keep all dressings clean & dry.    Follow up visit: 2 Weeks - Thursday April 3rd at 10:00 am    Supplies: Per Home Health  - Mechanical Debridement was completed today by using a saline and gauze.     We have sent your supply order to the following company:  [employer] Phone # [phone] for velcro compression wraps only    If you don't receive the items you were expecting or don't know what the items are that you received, call the company where the order was sent.   If you are unable to obtain wound supplies, continue to use the supplies you have available until you are able to reach us. It is most important to keep the wound covered at all times.  It is YOUR responsibility to make sure that supplies are re-ordered before you run out.

## 2025-03-20 NOTE — PROGRESS NOTES
Unna Boot Application   Below Knee    NAME:  Jake Pham  YOB: 1960  MEDICAL RECORD NUMBER:  [id number]  DATE:  3/20/2025    Unna boot: Applied moisturizing agent to dry skin as needed.   Appied primary and secondary dressing as ordered.  Applied Unna roll from toes to knee overlapping each time.   Applied ace wrap or coban from toes to below the knee.   Secured with tape and/or metal clips covered with tape.   Instructed patient/caregiver to keep dressing dry and intact. DO NOT REMOVE DRESSING.   Instructed pt/family/caregiver to report excessive draining, loose bandage, wet dressing, severe pain or tingling in toes.  Applied Unna Boot dressing below the knee to left lower leg.    Unna Boot(s) were applied per  Guidelines.     Electronically signed by Kerry Mckay RN on 3/20/2025 at 10:48 AM     
  Compression Garments:  Wound care orders will come in a separate order.       Supply Company for Compression Stockings:     [employer] [address] [place] [address] f: [phone] f: [phone] p: [phone] [email or web address]      Ordering Center:     [facility]  [address]  [place] [address]  [phone]  WOUND CARE Dept: [phone]   FAX NUMBER [phone]    Patient Information:      Jake Njaera  [address]  [address]  [place] [address]   [phone]   : 1960  AGE: 65 y.o.     GENDER: male   TODAYS DATE:  3/20/2025    Insurance:      PRIMARY INSURANCE:  Plan: MEDICARE PART A AND B  Coverage: MEDICARE  Effective Date: 2022  Group Number: [unfilled]  Subscriber Number: [id number] - (Medicare)    Payer/Plan Subscr  Sex Relation Sub. Ins. ID Effective Group Num   1. MEDICARE - [place]* JAKE NAJERA 1960 Male Self [id number]                                    [address] [address]   2. [place] -* JAKE NAJERA 1960 Male Self [id number]                                    [address]       Patient Information:      Problem List Items Addressed This Visit          Musculoskeletal and Integument    Venous stasis ulcer of left lower leg with edema of left lower leg (HCC) - Primary    Relevant Orders    External Referral To Home Health       Other    Lymphedema    Relevant Orders    External Referral To Home Health       Wound 25 Leg Left;Posterior (Active)   Wound Image   25 0945   Wound Etiology Venous 25 0945   Dressing Status Intact;New dressing applied 25 0945   Wound Cleansed Cleansed with saline 25 0945   Dressing/Treatment Alginate;Other (comment);ABD;Roll gauze;Coban/self-adherent bandages 25 0945   Offloading for Diabetic Foot Ulcers Offloading not required 25 0945   Wound Length (cm) 3 cm 25 0945   Wound Width (cm) 2 cm 25 0945   Wound Depth (cm) 0.05 
unable to keep appointment. [phone]    If you experience any of the following, please call the Wound Care Service during business hours: Monday through Friday 8:00 am - 4:30 pm  ([phone]).   *Increase in pain   *Temperature over 101   *Increase in drainage from your wound or a foul odor   *Uncontrolled swelling   *Need for compression bandage changes due to slippage, breakthrough drainage    If you need medical attention outside of business hours, please contact your Primary Care Doctor or go to the nearest emergency room.                Electronically signed by MAGGIE Baez CNP on 3/20/2025 at 12:33 PM

## 2025-03-20 NOTE — PLAN OF CARE
Problem: Wound:  Goal: Will show signs of wound healing; wound closure and no evidence of infection  Description: Will show signs of wound healing; wound closure and no evidence of infection  Outcome: Progressing   Patient presents to wound clinic for evaluation and treatment of left leg wound. Discharge instructions/dressing orders per AVS. Follow up appointment scheduled in 2 weeks.     Care plan reviewed with patient.  Patient verbalize understanding of the plan of care and contribute to goal setting.

## 2025-03-24 ENCOUNTER — TELEPHONE (OUTPATIENT)
Dept: WOUND CARE | Age: 65
End: 2025-03-24

## 2025-03-24 NOTE — TELEPHONE ENCOUNTER
Received call from patient that he has not heard anything from home health. Called [facility] and they stated they will be contacting patient today and will come out to see him tomorrow. Called patient and updated him.

## 2025-04-03 ENCOUNTER — HOSPITAL ENCOUNTER (OUTPATIENT)
Dept: WOUND CARE | Age: 65
Discharge: HOME | End: 2025-04-03
Attending: NURSE PRACTITIONER
Payer: MEDICARE

## 2025-04-03 VITALS
SYSTOLIC BLOOD PRESSURE: 128 MMHG | DIASTOLIC BLOOD PRESSURE: 69 MMHG | HEART RATE: 68 BPM | TEMPERATURE: 97.1 F | RESPIRATION RATE: 16 BRPM | OXYGEN SATURATION: 94 %

## 2025-04-03 DIAGNOSIS — I83.892 VENOUS STASIS ULCER OF LEFT LOWER LEG WITH EDEMA OF LEFT LOWER LEG (HCC): Primary | ICD-10-CM

## 2025-04-03 DIAGNOSIS — I89.0 LYMPHEDEMA: ICD-10-CM

## 2025-04-03 DIAGNOSIS — L97.929 VENOUS STASIS ULCER OF LEFT LOWER LEG WITH EDEMA OF LEFT LOWER LEG (HCC): Primary | ICD-10-CM

## 2025-04-03 DIAGNOSIS — R60.0 VENOUS STASIS ULCER OF LEFT LOWER LEG WITH EDEMA OF LEFT LOWER LEG (HCC): Primary | ICD-10-CM

## 2025-04-03 DIAGNOSIS — I83.029 VENOUS STASIS ULCER OF LEFT LOWER LEG WITH EDEMA OF LEFT LOWER LEG (HCC): Primary | ICD-10-CM

## 2025-04-03 PROCEDURE — 99212 OFFICE O/P EST SF 10 MIN: CPT

## 2025-04-03 PROCEDURE — 99212 OFFICE O/P EST SF 10 MIN: CPT | Performed by: NURSE PRACTITIONER

## 2025-04-03 RX ORDER — LIDOCAINE HYDROCHLORIDE 20 MG/ML
JELLY TOPICAL PRN
OUTPATIENT
Start: 2025-04-03

## 2025-04-03 RX ORDER — LIDOCAINE 50 MG/G
OINTMENT TOPICAL PRN
OUTPATIENT
Start: 2025-04-03

## 2025-04-03 RX ORDER — GINSENG 100 MG
CAPSULE ORAL PRN
OUTPATIENT
Start: 2025-04-03

## 2025-04-03 RX ORDER — TRIAMCINOLONE ACETONIDE 1 MG/G
OINTMENT TOPICAL PRN
OUTPATIENT
Start: 2025-04-03

## 2025-04-03 RX ORDER — SODIUM CHLOR/HYPOCHLOROUS ACID 0.033 %
SOLUTION, IRRIGATION IRRIGATION PRN
OUTPATIENT
Start: 2025-04-03

## 2025-04-03 RX ORDER — MUPIROCIN 20 MG/G
OINTMENT TOPICAL PRN
OUTPATIENT
Start: 2025-04-03

## 2025-04-03 RX ORDER — GENTAMICIN SULFATE 1 MG/G
OINTMENT TOPICAL PRN
OUTPATIENT
Start: 2025-04-03

## 2025-04-03 RX ORDER — LIDOCAINE 40 MG/G
CREAM TOPICAL PRN
OUTPATIENT
Start: 2025-04-03

## 2025-04-03 RX ORDER — BACITRACIN ZINC AND POLYMYXIN B SULFATE 500; 1000 [USP'U]/G; [USP'U]/G
OINTMENT TOPICAL PRN
OUTPATIENT
Start: 2025-04-03

## 2025-04-03 RX ORDER — SILVER SULFADIAZINE 10 MG/G
CREAM TOPICAL PRN
OUTPATIENT
Start: 2025-04-03

## 2025-04-03 RX ORDER — CLOBETASOL PROPIONATE 0.5 MG/G
OINTMENT TOPICAL PRN
OUTPATIENT
Start: 2025-04-03

## 2025-04-03 RX ORDER — LIDOCAINE HYDROCHLORIDE 40 MG/ML
SOLUTION TOPICAL PRN
OUTPATIENT
Start: 2025-04-03

## 2025-04-03 RX ORDER — NEOMYCIN/BACITRACIN/POLYMYXINB 3.5-400-5K
OINTMENT (GRAM) TOPICAL PRN
OUTPATIENT
Start: 2025-04-03

## 2025-04-03 RX ORDER — BETAMETHASONE DIPROPIONATE 0.5 MG/G
CREAM TOPICAL PRN
OUTPATIENT
Start: 2025-04-03

## 2025-04-03 NOTE — PROGRESS NOTES
[facility]  Consult and Procedure Note      Jake Pham  MEDICAL RECORD NUMBER:  [id number]  AGE: 65 y.o.   GENDER: male  : 1960  EPISODE DATE:  4/3/2025  Referring Provider: MAGGIE Medrano*   Reason for Referral: left leg    SUBJECTIVE:     Chief Complaint   Patient presents with    Wound Check         HISTORY OF PRESENT ILLNESS      Jake Pham is a 65 y.o. male who presents today for wound/ulcer evaluation. Patient is returning to the wound center for a recurrent or new wound. Wound duration: unknown .    Patient presents today for evaluation of left lower leg wound.  He has extensive history of venous disease and lymphedema to legs with open wounds, has followed previously with Dr. Andrews, Dr. Rainey, Dr. Umanzor, Dr. Glez and [facility]. Was last evaluated by me 2023 at which time wounds were healed. He states he has compression stockings and lymphedema pumps at home, did not use as directed prompting recurrence of wounds.  He was evaluated by PCP for this problem who prescribed Doxycycline and referred him to clinic  Current ordered wound care includes calcium alginate and unna boot changed twice weekly per home health.  Patient denies concerns with wound care, states that drainage has resolved, he thinks wounds may be healed. He denies pain to leg.  He denies any fevers, chills.  He denies any further needs or concerns.     PAST MEDICAL HISTORY             Diagnosis Date    Cellulitis     Depressed     had in high school    Diabetes mellitus (HCC)     Hyperlipidemia     Hypertension        PAST SURGICAL HISTORY     Past Surgical History:   Procedure Laterality Date    EYE SURGERY      laser    FOOT SURGERY      VEIN SURGERY Left        FAMILY HISTORY     Family History   Problem Relation Age of Onset    Arthritis Mother     Diabetes Mother     High Blood Pressure Mother     Vision Loss Mother     Arthritis Father     Heart

## 2025-04-03 NOTE — PATIENT INSTRUCTIONS
Visit Discharge/Physician Orders:  - Elevate leg to help decrease swelling.   - Recommend using your lymphedema pumps twice daily for 1 hour each time.   - Juxta lite velcro compression wraps to bilateral legs  - apply a good oil based moisturizer after showering before applying compression.     Home Care: Referral to P    Wound Location: Left lower leg - HEALED    Dressing orders:     1) Gather wound care supplies and arrange on clean table.     2) Wash your hands with soap and water or use alcohol based hand  for 20 seconds (sing \"Happy Birthday\" twice).    3) Cleanse wounds with normal saline or wound cleanser and gauze. Pat dry with clean gauze.    4) Bilateral legs - juxtalite compression to both legs. On in the morning off at night.        Keep all dressings clean & dry.    Follow up visit:  No need for follow up at this time. Referral good for three months from today. Call with any concerns     Supplies: Per Home Health  - Mechanical Debridement was completed today by using a saline and gauze.     We have sent your supply order to the following company:  [employer]    If you don't receive the items you were expecting or don't know what the items are that you received, call the company where the order was sent.   If you are unable to obtain wound supplies, continue to use the supplies you have available until you are able to reach us. It is most important to keep the wound covered at all times.  It is YOUR responsibility to make sure that supplies are re-ordered before you run out. Re-order telephone numbers are included in each package.     Keep next scheduled appointment. Please give 24 hour notice if unable to keep appointment. [phone]    If you experience any of the following, please call the Wound Care Service during business hours: Monday through Friday 8:00 am - 4:30 pm  ([phone]).   *Increase in pain   *Temperature over 101   *Increase in drainage from your wound or a foul

## 2025-04-03 NOTE — PLAN OF CARE
Problem: Wound:  Goal: Will show signs of wound healing; wound closure and no evidence of infection  Description: Will show signs of wound healing; wound closure and no evidence of infection  Outcome: Adequate for Discharge  Note: Patient seen for left leg wound. Wound shows signs of proper closure and healing. Wound is healed today.  No s/s of infection noted. Follow up as needed. See AVS for order changes.      Care plan reviewed with patient.  Patient verbalize understanding of the plan of care and contribute to goal setting.

## 2025-04-07 ENCOUNTER — TELEPHONE (OUTPATIENT)
Dept: WOUND CARE | Age: 65
End: 2025-04-07

## 2025-04-07 NOTE — TELEPHONE ENCOUNTER
----- Message from Cesia ROTH sent at 4/7/2025  9:01 AM EDT -----  CELL [phone]  OFFICE [phone]  [phone]      JESSICA WITH [facility] SAYS THEY DID NOT GET ANY ORDERS? ARE THEY SUPPOSED TO CONTINUE WITH UNABOOT, OR DO THEY EVEN NEED TO CONTINUE CARE?

## 2025-04-14 PROBLEM — L97.929 VENOUS STASIS ULCER OF LEFT LOWER LEG WITH EDEMA OF LEFT LOWER LEG (HCC): Status: RESOLVED | Noted: 2023-09-06 | Resolved: 2025-04-14

## 2025-04-14 PROBLEM — R60.0 VENOUS STASIS ULCER OF LEFT LOWER LEG WITH EDEMA OF LEFT LOWER LEG (HCC): Status: RESOLVED | Noted: 2023-09-06 | Resolved: 2025-04-14

## 2025-04-14 PROBLEM — I83.892 VENOUS STASIS ULCER OF LEFT LOWER LEG WITH EDEMA OF LEFT LOWER LEG (HCC): Status: RESOLVED | Noted: 2023-09-06 | Resolved: 2025-04-14

## 2025-04-14 PROBLEM — I83.029 VENOUS STASIS ULCER OF LEFT LOWER LEG WITH EDEMA OF LEFT LOWER LEG (HCC): Status: RESOLVED | Noted: 2023-09-06 | Resolved: 2025-04-14

## 2025-04-25 ENCOUNTER — OFFICE VISIT (OUTPATIENT)
Age: 65
End: 2025-04-25
Payer: MEDICARE

## 2025-04-25 VITALS
BODY MASS INDEX: 39.56 KG/M2 | HEART RATE: 70 BPM | WEIGHT: 282.6 LBS | OXYGEN SATURATION: 96 % | SYSTOLIC BLOOD PRESSURE: 112 MMHG | HEIGHT: 71 IN | DIASTOLIC BLOOD PRESSURE: 68 MMHG | TEMPERATURE: 97.8 F

## 2025-04-25 DIAGNOSIS — R60.0 BILATERAL LOWER EXTREMITY EDEMA: ICD-10-CM

## 2025-04-25 DIAGNOSIS — E66.9 OBESITY (BMI 30-39.9): ICD-10-CM

## 2025-04-25 DIAGNOSIS — G47.33 OSA (OBSTRUCTIVE SLEEP APNEA): Primary | ICD-10-CM

## 2025-04-25 DIAGNOSIS — Z95.820 S/P PERCUTANEOUS TRANSLUMINAL ANGIOPLASTY (PTA) WITH STENT PLACEMENT: ICD-10-CM

## 2025-04-25 DIAGNOSIS — G47.19 EXCESSIVE DAYTIME SLEEPINESS: ICD-10-CM

## 2025-04-25 PROCEDURE — 1036F TOBACCO NON-USER: CPT | Performed by: INTERNAL MEDICINE

## 2025-04-25 PROCEDURE — G8427 DOCREV CUR MEDS BY ELIG CLIN: HCPCS | Performed by: INTERNAL MEDICINE

## 2025-04-25 PROCEDURE — 3074F SYST BP LT 130 MM HG: CPT | Performed by: INTERNAL MEDICINE

## 2025-04-25 PROCEDURE — 3078F DIAST BP <80 MM HG: CPT | Performed by: INTERNAL MEDICINE

## 2025-04-25 PROCEDURE — G8417 CALC BMI ABV UP PARAM F/U: HCPCS | Performed by: INTERNAL MEDICINE

## 2025-04-25 PROCEDURE — 99204 OFFICE O/P NEW MOD 45 MIN: CPT | Performed by: INTERNAL MEDICINE

## 2025-04-25 PROCEDURE — 3017F COLORECTAL CA SCREEN DOC REV: CPT | Performed by: INTERNAL MEDICINE

## 2025-04-25 NOTE — PROGRESS NOTES
New Sleep Patient H/P    Presentation:  Jake is referred by sanam lyman  for  REAL      History of Present Illness      Micaela Jose Walker  Sleeps on chair  Snores  Very pleasant 65-year-old gentleman with history of severe obstructive sleep apnea, not using CPAP for the last couple of years  Currently very symptomatic, wishes to resume PAP treatment    Morbidly obese, saw Dr. Garcia in 2019, underwent polysomnogram with a split-night protocol revealing severe obstructive sleep apnea with an AHI of 100 initiated on PAP therapy, however had difficulty using the device with poor compliance, eventually he has stopped using PAP device    Current symptoms include difficulty breathing at night, snoring, sleeps in a recliner, sleep-related choking and gasping, difficulty waking up in the morning, unrefreshing nonrestorative sleep, bilateral lower extremity edema    Patient is morbidly obese with a BMI of 39.4, history of diabetes mellitus, coronary artery disease status post percutaneous coronary and arthroplasty, glaucoma, lymphedema    Medications include atorvastatin, latanoprost, lisinopril, metformin, metoprolol, prasugrel           Time in Bed:   Bedtime: 12a.m.   Awakens  8 a.m.   Different on weekends? No       Jake falls asleep in 60  minutes.  Any awakenings? Yes  Difficulty Falling back to sleep? No  Symptoms began:  several years ago.    Symptoms include: snoring, gasping, periods of not breathing, excessive daytime sleepiness, falling asleep while reading, watching television, disrupted sleep, naps    Previous evaluation and treatment? Yes  Where? [facility]  Which ones? [facility] with C PAP titration    He denies any history of sleep walking or sleep talking. No history of seizures activity. No history suggestive of restless legs syndrome. No history of bruxism. No history of head injury.    Naps:  Any naps? Yes and are they helpful Yes    Snoring and

## 2025-05-14 ENCOUNTER — HOSPITAL ENCOUNTER (OUTPATIENT)
Dept: SLEEP CENTER | Age: 65
Discharge: HOME | End: 2025-05-14
Payer: MEDICARE

## 2025-05-14 DIAGNOSIS — G47.33 OSA (OBSTRUCTIVE SLEEP APNEA): ICD-10-CM

## 2025-05-14 DIAGNOSIS — E66.9 OBESITY (BMI 30-39.9): ICD-10-CM

## 2025-05-15 PROCEDURE — 95806 SLEEP STUDY UNATT&RESP EFFT: CPT

## 2025-06-02 ENCOUNTER — OFFICE VISIT (OUTPATIENT)
Age: 65
End: 2025-06-02
Payer: MEDICARE

## 2025-06-02 VITALS
SYSTOLIC BLOOD PRESSURE: 150 MMHG | HEART RATE: 73 BPM | TEMPERATURE: 98 F | HEIGHT: 70 IN | OXYGEN SATURATION: 96 % | BODY MASS INDEX: 39.74 KG/M2 | WEIGHT: 277.6 LBS | DIASTOLIC BLOOD PRESSURE: 82 MMHG

## 2025-06-02 DIAGNOSIS — G47.33 OSA (OBSTRUCTIVE SLEEP APNEA): Primary | ICD-10-CM

## 2025-06-02 DIAGNOSIS — G47.19 EXCESSIVE DAYTIME SLEEPINESS: ICD-10-CM

## 2025-06-02 DIAGNOSIS — I10 BENIGN ESSENTIAL HTN: ICD-10-CM

## 2025-06-02 DIAGNOSIS — E66.9 OBESITY (BMI 30-39.9): ICD-10-CM

## 2025-06-02 PROBLEM — I25.10 ARTERIOSCLEROSIS OF CORONARY ARTERY: Status: ACTIVE | Noted: 2025-06-02

## 2025-06-02 PROBLEM — R06.09 DYSPNEA ON EXERTION: Status: ACTIVE | Noted: 2025-06-02

## 2025-06-02 PROBLEM — M21.169 ACQUIRED GENU VARUM: Status: ACTIVE | Noted: 2025-06-02

## 2025-06-02 PROBLEM — M48.02 SPINAL STENOSIS OF CERVICAL REGION: Status: ACTIVE | Noted: 2025-06-02

## 2025-06-02 PROBLEM — F41.8 MIXED ANXIETY DEPRESSIVE DISORDER: Status: ACTIVE | Noted: 2025-06-02

## 2025-06-02 PROBLEM — R94.39 ABNORMAL CARDIOVASCULAR STRESS TEST: Status: ACTIVE | Noted: 2024-05-09

## 2025-06-02 PROBLEM — M54.2 CERVICALGIA: Status: ACTIVE | Noted: 2025-06-02

## 2025-06-02 PROBLEM — M47.812 CERVICAL SPONDYLOSIS WITHOUT MYELOPATHY: Status: ACTIVE | Noted: 2025-06-02

## 2025-06-02 PROBLEM — E11.319 DIABETIC RETINOPATHY ASSOCIATED WITH TYPE 2 DIABETES MELLITUS (HCC): Status: ACTIVE | Noted: 2024-12-23

## 2025-06-02 PROBLEM — B35.1 ONYCHOMYCOSIS OF TOENAIL: Status: ACTIVE | Noted: 2024-05-09

## 2025-06-02 PROCEDURE — G8427 DOCREV CUR MEDS BY ELIG CLIN: HCPCS | Performed by: NURSE PRACTITIONER

## 2025-06-02 PROCEDURE — G8417 CALC BMI ABV UP PARAM F/U: HCPCS | Performed by: NURSE PRACTITIONER

## 2025-06-02 PROCEDURE — 3079F DIAST BP 80-89 MM HG: CPT | Performed by: NURSE PRACTITIONER

## 2025-06-02 PROCEDURE — 3017F COLORECTAL CA SCREEN DOC REV: CPT | Performed by: NURSE PRACTITIONER

## 2025-06-02 PROCEDURE — 99214 OFFICE O/P EST MOD 30 MIN: CPT | Performed by: NURSE PRACTITIONER

## 2025-06-02 PROCEDURE — 1036F TOBACCO NON-USER: CPT | Performed by: NURSE PRACTITIONER

## 2025-06-02 PROCEDURE — 3077F SYST BP >= 140 MM HG: CPT | Performed by: NURSE PRACTITIONER

## 2025-06-02 NOTE — PROGRESS NOTES
[facility]    Jake Pham, 65 y.o.  [id number]    Nurses Notes   HST follow up   Study Results  Initial Study Date -  5.14.25  AHI -  27.4    Total Events - 143  (Apneas  0  Hypopneas 143  Central  0)  (Total Sleep Time - 313.5 min)  Time with Sats below 88% - 25.8 min    ESS:8  SAQLI:82  NECK:18.5  MP:3  Interval History        History of Present Illness  The patient is a 65-year-old male who presents for a sleep study follow-up to review the results of the home sleep apnea test.    He reports experiencing difficulty sleeping on flat surfaces, finding it easier to sleep in a seated position. He also experiences episodes of shortness of breath upon awakening. His most restful sleep period is typically between 4:30 and 5:30 in the morning.   He has previously undergone a sleep study at [facility], which revealed severe sleep apnea with approximately 100 apneic episodes per hour.   Despite having a CPAP machine at home, he discontinued its use due to discomfort from the mask and the sensation of air pressure on his face throughout the night.   He is considering resuming CPAP therapy and is open to undergoing another overnight sleep study.    He takes his blood pressure medication daily, although not at consistent times.  Did not take BP med yet today       Meds  Current Outpatient Medications   Medication Sig Dispense Refill    latanoprost (XALATAN) 0.005 % ophthalmic solution Place 1 drop into both eyes nightly      glimepiride (AMARYL) 4 MG tablet Take 1 tablet by mouth every morning (before breakfast) 30 tablet 0    SM ASPIRIN ADULT LOW STRENGTH 81 MG EC tablet take 1 tablet by mouth once daily      prasugrel (EFFIENT) 10 MG TABS Take 0.5 tablets by mouth daily      metoprolol succinate (TOPROL XL) 25 MG extended release tablet take 1 tablet by mouth once daily      atorvastatin (LIPITOR) 10 MG tablet Take 2 tablets by mouth daily      metFORMIN (GLUCOPHAGE)

## 2025-06-10 ENCOUNTER — HOSPITAL ENCOUNTER (OUTPATIENT)
Dept: SLEEP CENTER | Age: 65
Discharge: HOME | End: 2025-06-12
Payer: MEDICARE

## 2025-06-10 DIAGNOSIS — G47.19 EXCESSIVE DAYTIME SLEEPINESS: ICD-10-CM

## 2025-06-10 DIAGNOSIS — G47.33 OSA (OBSTRUCTIVE SLEEP APNEA): ICD-10-CM

## 2025-06-10 DIAGNOSIS — E66.9 OBESITY (BMI 30-39.9): ICD-10-CM

## 2025-06-10 DIAGNOSIS — I10 BENIGN ESSENTIAL HTN: ICD-10-CM

## 2025-06-10 PROCEDURE — 95811 POLYSOM 6/>YRS CPAP 4/> PARM: CPT

## 2025-06-11 ENCOUNTER — TELEPHONE (OUTPATIENT)
Dept: SLEEP CENTER | Age: 65
End: 2025-06-11

## 2025-06-11 NOTE — TELEPHONE ENCOUNTER
Jake was here on 6/10/25 to complete a CPAP titration. He arrived and tried on several masks at different pressures including BiPAP. He could not tolerate any of the masks or pressures and ended up terminating his sleep study. He would like to discuss with the provider and may use a sleep aid if he decides to come back for another trial. Please R/S patient with [facility] to discuss patient issues when here for the titration per patient request. Thanks so much.

## 2025-07-03 ENCOUNTER — OFFICE VISIT (OUTPATIENT)
Age: 65
End: 2025-07-03
Payer: MEDICARE

## 2025-07-03 VITALS
HEART RATE: 80 BPM | WEIGHT: 285.9 LBS | DIASTOLIC BLOOD PRESSURE: 60 MMHG | TEMPERATURE: 97.7 F | HEIGHT: 70 IN | OXYGEN SATURATION: 96 % | BODY MASS INDEX: 40.93 KG/M2 | SYSTOLIC BLOOD PRESSURE: 124 MMHG

## 2025-07-03 DIAGNOSIS — Z73.810 SLEEP-ONSET ASSOCIATION DISORDER: ICD-10-CM

## 2025-07-03 DIAGNOSIS — Z78.9 DIFFICULTY WITH CPAP USE: ICD-10-CM

## 2025-07-03 DIAGNOSIS — G47.33 OSA (OBSTRUCTIVE SLEEP APNEA): Primary | ICD-10-CM

## 2025-07-03 PROCEDURE — G8427 DOCREV CUR MEDS BY ELIG CLIN: HCPCS | Performed by: NURSE PRACTITIONER

## 2025-07-03 PROCEDURE — 1036F TOBACCO NON-USER: CPT | Performed by: NURSE PRACTITIONER

## 2025-07-03 PROCEDURE — 3074F SYST BP LT 130 MM HG: CPT | Performed by: NURSE PRACTITIONER

## 2025-07-03 PROCEDURE — 3017F COLORECTAL CA SCREEN DOC REV: CPT | Performed by: NURSE PRACTITIONER

## 2025-07-03 PROCEDURE — G8417 CALC BMI ABV UP PARAM F/U: HCPCS | Performed by: NURSE PRACTITIONER

## 2025-07-03 PROCEDURE — 3078F DIAST BP <80 MM HG: CPT | Performed by: NURSE PRACTITIONER

## 2025-07-03 PROCEDURE — 99214 OFFICE O/P EST MOD 30 MIN: CPT | Performed by: NURSE PRACTITIONER

## 2025-07-03 RX ORDER — TEMAZEPAM 15 MG/1
15 CAPSULE ORAL NIGHTLY PRN
Qty: 30 CAPSULE | Refills: 0 | Status: SHIPPED | OUTPATIENT
Start: 2025-07-03 | End: 2025-08-02

## 2025-07-03 NOTE — PROGRESS NOTES
[facility]       Jake Pham         [id number]  7/3/2025   Chief Complaint   Patient presents with    Follow-up     needing seen has tried several masks and different pressures he could not tolerate those. He did not finish his sleep study wants to discuss sleep aides        Pt of Dr. Caio LONG Download:   Original or initial AHI: 27.4     Date of initial study: 5/14/2025      Neck Size: 18.5  Mallampati Mallampati 3  ESS:  7  SAQLI: 77    Here is a scan of the most recent download:  N/A not on PAP therapy     Results  - Diagnostic Testing:    - Home sleep study (05/14/2024):      - AHI: 27.4      - Oxygen saturation: 88% or lower for 25.8 minutes  '  Presentation:   History of Present Illness  The patient is a 65-year-old male who presents for a sleep follow-up. The primary reason for this visit is to address issues with CPAP therapy.    Issues with CPAP Therapy  He had previously discontinued CPAP therapy due to discomfort from the mask and the sensation of air pressure on his face throughout the night. He is seeking to resume PAP therapy and requires repeat testing. A home sleep study was completed on 05/14/2024, which showed an AHI of 27.4 and oxygen saturation levels of 88% or lower for 25.8 minutes.  - Onset: Discontinued CPAP therapy previously due to discomfort.  - Character: Discomfort from the mask and sensation of air pressure on his face.  - Alleviating/Aggravating Factors: Discomfort led to discontinuation; seeking to resume therapy.  - Timing: Home sleep study on 05/14/2024.  - Severity: AHI of 27.4 and oxygen saturation levels of 88% or lower for 25.8 minutes.    He attempted a titration study, but it was unsuccessful due to restlessness and difficulty falling asleep, leading to the mask being dislodged several times. This discomfort mirrors his previous experience with home therapy, which he also discontinued. He brought his CPAP machine, which is approximately 5 years old, to

## 2025-07-12 ENCOUNTER — HOSPITAL ENCOUNTER (OUTPATIENT)
Age: 65
Discharge: HOME | End: 2025-07-12
Payer: MEDICARE

## 2025-07-12 LAB
ALBUMIN SERPL BCG-MCNC: 4.1 G/DL (ref 3.4–4.9)
ALP SERPL-CCNC: 79 U/L (ref 40–129)
ALT SERPL W/O P-5'-P-CCNC: 10 U/L (ref 10–50)
ANION GAP SERPL CALC-SCNC: 12 MEQ/L (ref 8–16)
AST SERPL-CCNC: 15 U/L (ref 10–50)
BILIRUB SERPL-MCNC: 0.8 MG/DL (ref 0.3–1.2)
BUN SERPL-MCNC: 10 MG/DL (ref 8–23)
CALCIUM SERPL-MCNC: 9.1 MG/DL (ref 8.8–10.2)
CHLORIDE SERPL-SCNC: 102 MEQ/L (ref 98–111)
CHOLEST SERPL-MCNC: 139 MG/DL (ref 100–199)
CO2 SERPL-SCNC: 25 MEQ/L (ref 22–29)
CREAT SERPL-MCNC: 0.7 MG/DL (ref 0.7–1.2)
GFR SERPL CREATININE-BSD FRML MDRD: > 90 ML/MIN/1.73M2
GLUCOSE SERPL-MCNC: 130 MG/DL (ref 74–109)
HDLC SERPL-MCNC: 30 MG/DL
LDLC SERPL CALC-MCNC: 78 MG/DL
POTASSIUM SERPL-SCNC: 4.3 MEQ/L (ref 3.5–5.2)
PROT SERPL-MCNC: 7.7 G/DL (ref 6.4–8.3)
SODIUM SERPL-SCNC: 139 MEQ/L (ref 135–145)
TRIGL SERPL-MCNC: 155 MG/DL (ref 0–199)

## 2025-07-12 PROCEDURE — 80053 COMPREHEN METABOLIC PANEL: CPT

## 2025-07-12 PROCEDURE — 80061 LIPID PANEL: CPT

## 2025-07-12 PROCEDURE — 36415 COLL VENOUS BLD VENIPUNCTURE: CPT

## 2025-08-26 ENCOUNTER — OFFICE VISIT (OUTPATIENT)
Age: 65
End: 2025-08-26
Payer: MEDICARE

## 2025-08-26 VITALS
DIASTOLIC BLOOD PRESSURE: 68 MMHG | WEIGHT: 282.7 LBS | HEART RATE: 72 BPM | HEIGHT: 71 IN | SYSTOLIC BLOOD PRESSURE: 118 MMHG | TEMPERATURE: 97.6 F | BODY MASS INDEX: 39.58 KG/M2 | OXYGEN SATURATION: 96 %

## 2025-08-26 DIAGNOSIS — G47.00 INSOMNIA, UNSPECIFIED TYPE: ICD-10-CM

## 2025-08-26 DIAGNOSIS — G47.33 OSA (OBSTRUCTIVE SLEEP APNEA): Primary | ICD-10-CM

## 2025-08-26 DIAGNOSIS — Z78.9 DIFFICULTY WITH CPAP USE: ICD-10-CM

## 2025-08-26 PROCEDURE — G8427 DOCREV CUR MEDS BY ELIG CLIN: HCPCS | Performed by: NURSE PRACTITIONER

## 2025-08-26 PROCEDURE — 99214 OFFICE O/P EST MOD 30 MIN: CPT | Performed by: NURSE PRACTITIONER

## 2025-08-26 PROCEDURE — 3017F COLORECTAL CA SCREEN DOC REV: CPT | Performed by: NURSE PRACTITIONER

## 2025-08-26 PROCEDURE — 3074F SYST BP LT 130 MM HG: CPT | Performed by: NURSE PRACTITIONER

## 2025-08-26 PROCEDURE — 1036F TOBACCO NON-USER: CPT | Performed by: NURSE PRACTITIONER

## 2025-08-26 PROCEDURE — G8417 CALC BMI ABV UP PARAM F/U: HCPCS | Performed by: NURSE PRACTITIONER

## 2025-08-26 PROCEDURE — 3078F DIAST BP <80 MM HG: CPT | Performed by: NURSE PRACTITIONER
